# Patient Record
Sex: MALE | Race: WHITE | Employment: OTHER | URBAN - METROPOLITAN AREA
[De-identification: names, ages, dates, MRNs, and addresses within clinical notes are randomized per-mention and may not be internally consistent; named-entity substitution may affect disease eponyms.]

---

## 2017-09-18 DIAGNOSIS — Z82.49 FAMILY HISTORY OF MYOCARDIAL INFARCTION: ICD-10-CM

## 2017-09-18 DIAGNOSIS — E55.9 VITAMIN D DEFICIENCY: ICD-10-CM

## 2017-09-18 DIAGNOSIS — I10 HYPERTENSION, ESSENTIAL: ICD-10-CM

## 2017-09-18 DIAGNOSIS — Z87.898 HISTORY OF DIZZINESS: ICD-10-CM

## 2017-09-18 DIAGNOSIS — F32.A ANXIETY AND DEPRESSION: ICD-10-CM

## 2017-09-18 DIAGNOSIS — F32.A DEPRESSION, UNSPECIFIED DEPRESSION TYPE: ICD-10-CM

## 2017-09-18 DIAGNOSIS — R41.3 MEMORY LOSS: ICD-10-CM

## 2017-09-18 DIAGNOSIS — Z87.898 HISTORY OF HEADACHE: ICD-10-CM

## 2017-09-18 DIAGNOSIS — F41.9 ANXIETY AND DEPRESSION: ICD-10-CM

## 2017-09-18 DIAGNOSIS — Z72.0 TOBACCO ABUSE: ICD-10-CM

## 2017-09-19 ENCOUNTER — OFFICE VISIT (OUTPATIENT)
Dept: CARDIOLOGY | Age: 72
End: 2017-09-19

## 2017-09-19 VITALS
WEIGHT: 206.6 LBS | HEART RATE: 87 BPM | OXYGEN SATURATION: 98 % | SYSTOLIC BLOOD PRESSURE: 160 MMHG | RESPIRATION RATE: 18 BRPM | DIASTOLIC BLOOD PRESSURE: 68 MMHG

## 2017-09-19 DIAGNOSIS — Z72.0 TOBACCO ABUSE: ICD-10-CM

## 2017-09-19 DIAGNOSIS — I20.9 ANGINA PECTORIS (HCC): ICD-10-CM

## 2017-09-19 DIAGNOSIS — Z82.49 FAMILY HISTORY OF MYOCARDIAL INFARCTION: ICD-10-CM

## 2017-09-19 DIAGNOSIS — I10 HYPERTENSION, ESSENTIAL: Primary | ICD-10-CM

## 2017-09-19 PROCEDURE — 99203 OFFICE O/P NEW LOW 30 MIN: CPT | Performed by: INTERNAL MEDICINE

## 2017-09-19 PROCEDURE — 4040F PNEUMOC VAC/ADMIN/RCVD: CPT | Performed by: INTERNAL MEDICINE

## 2017-09-19 PROCEDURE — G8427 DOCREV CUR MEDS BY ELIG CLIN: HCPCS | Performed by: INTERNAL MEDICINE

## 2017-09-19 PROCEDURE — G8599 NO ASA/ANTIPLAT THER USE RNG: HCPCS | Performed by: INTERNAL MEDICINE

## 2017-09-19 PROCEDURE — 3017F COLORECTAL CA SCREEN DOC REV: CPT | Performed by: INTERNAL MEDICINE

## 2017-09-19 PROCEDURE — 4004F PT TOBACCO SCREEN RCVD TLK: CPT | Performed by: INTERNAL MEDICINE

## 2017-09-19 PROCEDURE — G8421 BMI NOT CALCULATED: HCPCS | Performed by: INTERNAL MEDICINE

## 2017-09-19 RX ORDER — METOPROLOL SUCCINATE 50 MG/1
50 TABLET, EXTENDED RELEASE ORAL NIGHTLY
Qty: 90 TABLET | Refills: 3 | Status: SHIPPED | OUTPATIENT
Start: 2017-09-19

## 2017-09-19 RX ORDER — FAMOTIDINE 40 MG/1
40 TABLET, FILM COATED ORAL NIGHTLY
Refills: 5 | COMMUNITY
Start: 2017-08-12 | End: 2017-09-20 | Stop reason: ALTCHOICE

## 2017-09-19 RX ORDER — FLUOXETINE HYDROCHLORIDE 20 MG/1
20 CAPSULE ORAL DAILY
Refills: 5 | COMMUNITY
Start: 2017-08-12 | End: 2017-09-19 | Stop reason: SDUPTHER

## 2017-09-19 RX ORDER — AMLODIPINE BESYLATE 5 MG/1
5 TABLET ORAL DAILY
Qty: 90 TABLET | Refills: 3 | Status: SHIPPED | OUTPATIENT
Start: 2017-09-19 | End: 2017-09-20 | Stop reason: ALTCHOICE

## 2017-09-19 RX ORDER — AMLODIPINE BESYLATE 5 MG/1
5 TABLET ORAL DAILY
Refills: 5 | COMMUNITY
Start: 2017-09-11 | End: 2017-09-19 | Stop reason: SDUPTHER

## 2017-09-19 RX ORDER — FLUOXETINE HYDROCHLORIDE 20 MG/1
20 CAPSULE ORAL DAILY
Qty: 90 CAPSULE | Refills: 3 | Status: SHIPPED | OUTPATIENT
Start: 2017-09-19

## 2017-09-19 ASSESSMENT — ENCOUNTER SYMPTOMS
SHORTNESS OF BREATH: 1
CHEST TIGHTNESS: 0

## 2017-09-20 ENCOUNTER — OFFICE VISIT (OUTPATIENT)
Dept: FAMILY MEDICINE CLINIC | Age: 72
End: 2017-09-20

## 2017-09-20 VITALS
WEIGHT: 205.2 LBS | BODY MASS INDEX: 31.1 KG/M2 | HEIGHT: 68 IN | DIASTOLIC BLOOD PRESSURE: 82 MMHG | SYSTOLIC BLOOD PRESSURE: 156 MMHG | RESPIRATION RATE: 14 BRPM | TEMPERATURE: 98.7 F | HEART RATE: 68 BPM

## 2017-09-20 DIAGNOSIS — G47.33 OBSTRUCTIVE SLEEP APNEA SYNDROME: ICD-10-CM

## 2017-09-20 DIAGNOSIS — I10 ESSENTIAL HYPERTENSION: Primary | ICD-10-CM

## 2017-09-20 PROCEDURE — 99213 OFFICE O/P EST LOW 20 MIN: CPT | Performed by: INTERNAL MEDICINE

## 2017-09-20 PROCEDURE — 4040F PNEUMOC VAC/ADMIN/RCVD: CPT | Performed by: INTERNAL MEDICINE

## 2017-09-20 PROCEDURE — 4004F PT TOBACCO SCREEN RCVD TLK: CPT | Performed by: INTERNAL MEDICINE

## 2017-09-20 PROCEDURE — 1123F ACP DISCUSS/DSCN MKR DOCD: CPT | Performed by: INTERNAL MEDICINE

## 2017-09-20 PROCEDURE — 3017F COLORECTAL CA SCREEN DOC REV: CPT | Performed by: INTERNAL MEDICINE

## 2017-09-20 PROCEDURE — G8427 DOCREV CUR MEDS BY ELIG CLIN: HCPCS | Performed by: INTERNAL MEDICINE

## 2017-09-20 PROCEDURE — G8417 CALC BMI ABV UP PARAM F/U: HCPCS | Performed by: INTERNAL MEDICINE

## 2017-09-20 PROCEDURE — G8599 NO ASA/ANTIPLAT THER USE RNG: HCPCS | Performed by: INTERNAL MEDICINE

## 2017-09-20 RX ORDER — AMLODIPINE BESYLATE 10 MG/1
10 TABLET ORAL DAILY
Qty: 30 TABLET | Refills: 3 | Status: SHIPPED | OUTPATIENT
Start: 2017-09-20

## 2017-09-20 ASSESSMENT — ENCOUNTER SYMPTOMS
NAUSEA: 0
TROUBLE SWALLOWING: 0
WHEEZING: 0
SORE THROAT: 0
DIARRHEA: 0
BLOOD IN STOOL: 0
EYE ITCHING: 0
EYE PAIN: 0
VOICE CHANGE: 0
EYE REDNESS: 0
ABDOMINAL PAIN: 0
COLOR CHANGE: 0
BACK PAIN: 0
SHORTNESS OF BREATH: 0
PHOTOPHOBIA: 0
EYE DISCHARGE: 0
CONSTIPATION: 0
ABDOMINAL DISTENTION: 0
COUGH: 0

## 2017-09-20 ASSESSMENT — PATIENT HEALTH QUESTIONNAIRE - PHQ9
1. LITTLE INTEREST OR PLEASURE IN DOING THINGS: 1
SUM OF ALL RESPONSES TO PHQ QUESTIONS 1-9: 2
2. FEELING DOWN, DEPRESSED OR HOPELESS: 1
SUM OF ALL RESPONSES TO PHQ9 QUESTIONS 1 & 2: 2

## 2023-11-17 ENCOUNTER — HOSPITAL ENCOUNTER (INPATIENT)
Facility: HOSPITAL | Age: 78
LOS: 3 days | Discharge: HOME | DRG: 291 | End: 2023-11-20
Attending: STUDENT IN AN ORGANIZED HEALTH CARE EDUCATION/TRAINING PROGRAM | Admitting: HOSPITALIST
Payer: MEDICARE

## 2023-11-17 ENCOUNTER — APPOINTMENT (OUTPATIENT)
Dept: RADIOLOGY | Facility: HOSPITAL | Age: 78
DRG: 291 | End: 2023-11-17
Payer: MEDICARE

## 2023-11-17 ENCOUNTER — APPOINTMENT (OUTPATIENT)
Dept: CARDIOLOGY | Facility: HOSPITAL | Age: 78
DRG: 291 | End: 2023-11-17
Payer: MEDICARE

## 2023-11-17 DIAGNOSIS — I50.9 ACUTE ON CHRONIC CONGESTIVE HEART FAILURE, UNSPECIFIED HEART FAILURE TYPE (MULTI): ICD-10-CM

## 2023-11-17 DIAGNOSIS — J96.01 ACUTE RESPIRATORY FAILURE WITH HYPOXIA (MULTI): ICD-10-CM

## 2023-11-17 DIAGNOSIS — I50.43 CHF (CONGESTIVE HEART FAILURE), NYHA CLASS I, ACUTE ON CHRONIC, COMBINED (MULTI): ICD-10-CM

## 2023-11-17 DIAGNOSIS — R79.89 ELEVATED TROPONIN LEVEL: ICD-10-CM

## 2023-11-17 LAB
ALBUMIN SERPL BCP-MCNC: 4.2 G/DL (ref 3.4–5)
ALP SERPL-CCNC: 134 U/L (ref 33–136)
ALT SERPL W P-5'-P-CCNC: 26 U/L (ref 10–52)
ANION GAP SERPL CALC-SCNC: 14 MMOL/L (ref 10–20)
AST SERPL W P-5'-P-CCNC: 28 U/L (ref 9–39)
BASOPHILS # BLD AUTO: 0.07 X10*3/UL (ref 0–0.1)
BASOPHILS NFR BLD AUTO: 1 %
BILIRUB SERPL-MCNC: 0.8 MG/DL (ref 0–1.2)
BNP SERPL-MCNC: 2647 PG/ML (ref 0–99)
BUN SERPL-MCNC: 29 MG/DL (ref 6–23)
CALCIUM SERPL-MCNC: 9.5 MG/DL (ref 8.6–10.3)
CARDIAC TROPONIN I PNL SERPL HS: 45 NG/L (ref 0–20)
CARDIAC TROPONIN I PNL SERPL HS: 47 NG/L (ref 0–20)
CHLORIDE SERPL-SCNC: 107 MMOL/L (ref 98–107)
CO2 SERPL-SCNC: 22 MMOL/L (ref 21–32)
CREAT SERPL-MCNC: 1.51 MG/DL (ref 0.5–1.3)
EOSINOPHIL # BLD AUTO: 0.19 X10*3/UL (ref 0–0.4)
EOSINOPHIL NFR BLD AUTO: 2.8 %
ERYTHROCYTE [DISTWIDTH] IN BLOOD BY AUTOMATED COUNT: 13.5 % (ref 11.5–14.5)
GFR SERPL CREATININE-BSD FRML MDRD: 47 ML/MIN/1.73M*2
GLUCOSE SERPL-MCNC: 96 MG/DL (ref 74–99)
HCT VFR BLD AUTO: 38.1 % (ref 41–52)
HGB BLD-MCNC: 12.7 G/DL (ref 13.5–17.5)
IMM GRANULOCYTES # BLD AUTO: 0.02 X10*3/UL (ref 0–0.5)
IMM GRANULOCYTES NFR BLD AUTO: 0.3 % (ref 0–0.9)
LYMPHOCYTES # BLD AUTO: 2.01 X10*3/UL (ref 0.8–3)
LYMPHOCYTES NFR BLD AUTO: 29.5 %
MAGNESIUM SERPL-MCNC: 2.2 MG/DL (ref 1.6–2.4)
MCH RBC QN AUTO: 31.9 PG (ref 26–34)
MCHC RBC AUTO-ENTMCNC: 33.3 G/DL (ref 32–36)
MCV RBC AUTO: 96 FL (ref 80–100)
MONOCYTES # BLD AUTO: 0.61 X10*3/UL (ref 0.05–0.8)
MONOCYTES NFR BLD AUTO: 8.9 %
NEUTROPHILS # BLD AUTO: 3.92 X10*3/UL (ref 1.6–5.5)
NEUTROPHILS NFR BLD AUTO: 57.5 %
NRBC BLD-RTO: 0 /100 WBCS (ref 0–0)
PLATELET # BLD AUTO: 202 X10*3/UL (ref 150–450)
POTASSIUM SERPL-SCNC: 4.4 MMOL/L (ref 3.5–5.3)
PROT SERPL-MCNC: 7.9 G/DL (ref 6.4–8.2)
RBC # BLD AUTO: 3.98 X10*6/UL (ref 4.5–5.9)
SODIUM SERPL-SCNC: 139 MMOL/L (ref 136–145)
WBC # BLD AUTO: 6.8 X10*3/UL (ref 4.4–11.3)

## 2023-11-17 PROCEDURE — 2500000004 HC RX 250 GENERAL PHARMACY W/ HCPCS (ALT 636 FOR OP/ED): Performed by: HOSPITALIST

## 2023-11-17 PROCEDURE — 93010 ELECTROCARDIOGRAM REPORT: CPT | Performed by: INTERNAL MEDICINE

## 2023-11-17 PROCEDURE — 1210000001 HC SEMI-PRIVATE ROOM DAILY

## 2023-11-17 PROCEDURE — 99223 1ST HOSP IP/OBS HIGH 75: CPT | Performed by: HOSPITALIST

## 2023-11-17 PROCEDURE — 93005 ELECTROCARDIOGRAM TRACING: CPT

## 2023-11-17 PROCEDURE — 80053 COMPREHEN METABOLIC PANEL: CPT | Performed by: STUDENT IN AN ORGANIZED HEALTH CARE EDUCATION/TRAINING PROGRAM

## 2023-11-17 PROCEDURE — 36415 COLL VENOUS BLD VENIPUNCTURE: CPT | Performed by: STUDENT IN AN ORGANIZED HEALTH CARE EDUCATION/TRAINING PROGRAM

## 2023-11-17 PROCEDURE — 2500000005 HC RX 250 GENERAL PHARMACY W/O HCPCS: Performed by: HOSPITALIST

## 2023-11-17 PROCEDURE — 84484 ASSAY OF TROPONIN QUANT: CPT | Performed by: STUDENT IN AN ORGANIZED HEALTH CARE EDUCATION/TRAINING PROGRAM

## 2023-11-17 PROCEDURE — 71046 X-RAY EXAM CHEST 2 VIEWS: CPT | Performed by: STUDENT IN AN ORGANIZED HEALTH CARE EDUCATION/TRAINING PROGRAM

## 2023-11-17 PROCEDURE — 85025 COMPLETE CBC W/AUTO DIFF WBC: CPT | Performed by: STUDENT IN AN ORGANIZED HEALTH CARE EDUCATION/TRAINING PROGRAM

## 2023-11-17 PROCEDURE — 83880 ASSAY OF NATRIURETIC PEPTIDE: CPT | Performed by: STUDENT IN AN ORGANIZED HEALTH CARE EDUCATION/TRAINING PROGRAM

## 2023-11-17 PROCEDURE — 2500000002 HC RX 250 W HCPCS SELF ADMINISTERED DRUGS (ALT 637 FOR MEDICARE OP, ALT 636 FOR OP/ED): Performed by: HOSPITALIST

## 2023-11-17 PROCEDURE — 94760 N-INVAS EAR/PLS OXIMETRY 1: CPT

## 2023-11-17 PROCEDURE — 83735 ASSAY OF MAGNESIUM: CPT | Performed by: STUDENT IN AN ORGANIZED HEALTH CARE EDUCATION/TRAINING PROGRAM

## 2023-11-17 PROCEDURE — 94640 AIRWAY INHALATION TREATMENT: CPT

## 2023-11-17 PROCEDURE — 71046 X-RAY EXAM CHEST 2 VIEWS: CPT | Mod: FY

## 2023-11-17 PROCEDURE — 99285 EMERGENCY DEPT VISIT HI MDM: CPT | Mod: 25 | Performed by: STUDENT IN AN ORGANIZED HEALTH CARE EDUCATION/TRAINING PROGRAM

## 2023-11-17 RX ORDER — FUROSEMIDE 10 MG/ML
40 INJECTION INTRAMUSCULAR; INTRAVENOUS 2 TIMES DAILY
Status: DISCONTINUED | OUTPATIENT
Start: 2023-11-17 | End: 2023-11-20

## 2023-11-17 RX ORDER — POLYETHYLENE GLYCOL 3350 17 G/17G
17 POWDER, FOR SOLUTION ORAL DAILY
Status: DISCONTINUED | OUTPATIENT
Start: 2023-11-17 | End: 2023-11-20 | Stop reason: HOSPADM

## 2023-11-17 RX ORDER — ASPIRIN 81 MG/1
81 TABLET ORAL DAILY
COMMUNITY

## 2023-11-17 RX ORDER — ACETAMINOPHEN 325 MG/1
650 TABLET ORAL EVERY 6 HOURS PRN
Status: DISCONTINUED | OUTPATIENT
Start: 2023-11-17 | End: 2023-11-20 | Stop reason: HOSPADM

## 2023-11-17 RX ORDER — ACETAMINOPHEN 160 MG/5ML
650 SOLUTION ORAL EVERY 6 HOURS PRN
Status: DISCONTINUED | OUTPATIENT
Start: 2023-11-17 | End: 2023-11-20 | Stop reason: HOSPADM

## 2023-11-17 RX ORDER — FUROSEMIDE 10 MG/ML
40 INJECTION INTRAMUSCULAR; INTRAVENOUS ONCE
Status: DISCONTINUED | OUTPATIENT
Start: 2023-11-17 | End: 2023-11-17

## 2023-11-17 RX ORDER — ENOXAPARIN SODIUM 100 MG/ML
40 INJECTION SUBCUTANEOUS EVERY 24 HOURS
Status: DISCONTINUED | OUTPATIENT
Start: 2023-11-17 | End: 2023-11-20 | Stop reason: HOSPADM

## 2023-11-17 RX ORDER — ACETAMINOPHEN 650 MG/1
650 SUPPOSITORY RECTAL EVERY 6 HOURS PRN
Status: DISCONTINUED | OUTPATIENT
Start: 2023-11-17 | End: 2023-11-20 | Stop reason: HOSPADM

## 2023-11-17 RX ORDER — ALBUTEROL SULFATE 0.83 MG/ML
2.5 SOLUTION RESPIRATORY (INHALATION)
Status: DISCONTINUED | OUTPATIENT
Start: 2023-11-17 | End: 2023-11-20

## 2023-11-17 RX ADMIN — FUROSEMIDE 40 MG: 10 INJECTION, SOLUTION INTRAMUSCULAR; INTRAVENOUS at 17:52

## 2023-11-17 RX ADMIN — ALBUTEROL SULFATE 2.5 MG: 2.5 SOLUTION RESPIRATORY (INHALATION) at 20:37

## 2023-11-17 RX ADMIN — Medication 3 L/MIN: at 20:37

## 2023-11-17 SDOH — SOCIAL STABILITY: SOCIAL INSECURITY: WERE YOU ABLE TO COMPLETE ALL THE BEHAVIORAL HEALTH SCREENINGS?: YES

## 2023-11-17 SDOH — SOCIAL STABILITY: SOCIAL INSECURITY: HAS ANYONE EVER THREATENED TO HURT YOUR FAMILY OR YOUR PETS?: NO

## 2023-11-17 SDOH — SOCIAL STABILITY: SOCIAL INSECURITY: DO YOU FEEL ANYONE HAS EXPLOITED OR TAKEN ADVANTAGE OF YOU FINANCIALLY OR OF YOUR PERSONAL PROPERTY?: NO

## 2023-11-17 SDOH — SOCIAL STABILITY: SOCIAL INSECURITY: ARE YOU OR HAVE YOU BEEN THREATENED OR ABUSED PHYSICALLY, EMOTIONALLY, OR SEXUALLY BY ANYONE?: NO

## 2023-11-17 SDOH — SOCIAL STABILITY: SOCIAL INSECURITY: HAVE YOU HAD THOUGHTS OF HARMING ANYONE ELSE?: NO

## 2023-11-17 SDOH — SOCIAL STABILITY: SOCIAL INSECURITY: DOES ANYONE TRY TO KEEP YOU FROM HAVING/CONTACTING OTHER FRIENDS OR DOING THINGS OUTSIDE YOUR HOME?: NO

## 2023-11-17 SDOH — SOCIAL STABILITY: SOCIAL INSECURITY: ARE THERE ANY APPARENT SIGNS OF INJURIES/BEHAVIORS THAT COULD BE RELATED TO ABUSE/NEGLECT?: NO

## 2023-11-17 SDOH — ECONOMIC STABILITY: HOUSING INSECURITY: DO YOU FEEL UNSAFE GOING BACK TO THE PLACE WHERE YOU LIVE?: NO

## 2023-11-17 SDOH — SOCIAL STABILITY: SOCIAL INSECURITY: ABUSE: ADULT

## 2023-11-17 SDOH — SOCIAL STABILITY: SOCIAL INSECURITY: DO YOU FEEL UNSAFE GOING BACK TO THE PLACE WHERE YOU ARE LIVING?: NO

## 2023-11-17 ASSESSMENT — ENCOUNTER SYMPTOMS
ABDOMINAL DISTENTION: 0
COUGH: 1
LIGHT-HEADEDNESS: 0
CHILLS: 0
HEADACHES: 0
FEVER: 0
VOMITING: 0
DIZZINESS: 0
WEAKNESS: 1
DIAPHORESIS: 0
NAUSEA: 0
PALPITATIONS: 0
SHORTNESS OF BREATH: 1

## 2023-11-17 ASSESSMENT — COGNITIVE AND FUNCTIONAL STATUS - GENERAL
MOBILITY SCORE: 24
PATIENT BASELINE BEDBOUND: NO
DAILY ACTIVITIY SCORE: 24

## 2023-11-17 ASSESSMENT — COLUMBIA-SUICIDE SEVERITY RATING SCALE - C-SSRS
2. HAVE YOU ACTUALLY HAD ANY THOUGHTS OF KILLING YOURSELF?: NO
6. HAVE YOU EVER DONE ANYTHING, STARTED TO DO ANYTHING, OR PREPARED TO DO ANYTHING TO END YOUR LIFE?: NO
1. IN THE PAST MONTH, HAVE YOU WISHED YOU WERE DEAD OR WISHED YOU COULD GO TO SLEEP AND NOT WAKE UP?: NO

## 2023-11-17 ASSESSMENT — ACTIVITIES OF DAILY LIVING (ADL)
ADEQUATE_TO_COMPLETE_ADL: YES
TOILETING: INDEPENDENT
GROOMING: INDEPENDENT
PATIENT'S MEMORY ADEQUATE TO SAFELY COMPLETE DAILY ACTIVITIES?: YES
JUDGMENT_ADEQUATE_SAFELY_COMPLETE_DAILY_ACTIVITIES: YES
DRESSING YOURSELF: INDEPENDENT
WALKS IN HOME: INDEPENDENT
HEARING - RIGHT EAR: FUNCTIONAL
LACK_OF_TRANSPORTATION: NO
BATHING: INDEPENDENT
HEARING - LEFT EAR: FUNCTIONAL
FEEDING YOURSELF: INDEPENDENT

## 2023-11-17 ASSESSMENT — PATIENT HEALTH QUESTIONNAIRE - PHQ9
2. FEELING DOWN, DEPRESSED OR HOPELESS: NOT AT ALL
1. LITTLE INTEREST OR PLEASURE IN DOING THINGS: NOT AT ALL
SUM OF ALL RESPONSES TO PHQ9 QUESTIONS 1 & 2: 0

## 2023-11-17 ASSESSMENT — LIFESTYLE VARIABLES
HAVE PEOPLE ANNOYED YOU BY CRITICIZING YOUR DRINKING: NO
EVER HAD A DRINK FIRST THING IN THE MORNING TO STEADY YOUR NERVES TO GET RID OF A HANGOVER: NO
HAVE YOU EVER FELT YOU SHOULD CUT DOWN ON YOUR DRINKING: NO
REASON UNABLE TO ASSESS: NO
HOW OFTEN DO YOU HAVE A DRINK CONTAINING ALCOHOL: NEVER
AUDIT-C TOTAL SCORE: 0
AUDIT-C TOTAL SCORE: 0
HOW MANY STANDARD DRINKS CONTAINING ALCOHOL DO YOU HAVE ON A TYPICAL DAY: PATIENT DOES NOT DRINK
EVER FELT BAD OR GUILTY ABOUT YOUR DRINKING: NO
HOW OFTEN DO YOU HAVE 6 OR MORE DRINKS ON ONE OCCASION: NEVER
SKIP TO QUESTIONS 9-10: 1

## 2023-11-17 ASSESSMENT — PAIN SCALES - GENERAL
PAINLEVEL_OUTOF10: 0 - NO PAIN
PAINLEVEL_OUTOF10: 0 - NO PAIN
PAINLEVEL_OUTOF10: 3
PAINLEVEL_OUTOF10: 0 - NO PAIN
PAINLEVEL_OUTOF10: 0 - NO PAIN

## 2023-11-17 ASSESSMENT — PAIN - FUNCTIONAL ASSESSMENT: PAIN_FUNCTIONAL_ASSESSMENT: 0-10

## 2023-11-17 NOTE — ED PROVIDER NOTES
HPI   Chief Complaint   Patient presents with    Shortness of Breath       This is a 78-year-old male with a history of nonischemic cardiomyopathy presenting with increasing shortness of breath on exertion.  Patient reports over the last several days he has had significant worsening shortness of breath.  Patient is unable to walk a few feet without getting short of breath.  Patient also having orthopnea and increasing lower leg swelling.  Patient is currently not on any diuretics.  Denies any chest pain.  Daughter reports that he had a cardiac cath through the [facility] earlier this year demonstrating no significant coronary artery disease.  Reports that he does not have AICD or pacemaker.      History provided by:  Patient (Patient's daughter)                      Behzad Coma Scale Score: 15                  Patient History   History reviewed. No pertinent past medical history.  History reviewed. No pertinent surgical history.  No family history on file.  Social History     Tobacco Use    Smoking status: Former     Types: Cigarettes    Smokeless tobacco: Never   Vaping Use    Vaping Use: Never used   Substance Use Topics    Alcohol use: Never    Drug use: Never       Physical Exam   ED Triage Vitals [11/17/23 1412]   Temp Heart Rate Resp BP   36.1 °C (97 °F) 84 (!) 30 162/75      SpO2 Temp Source Heart Rate Source Patient Position   96 % Tympanic Monitor Sitting      BP Location FiO2 (%)     Left arm --       Physical Exam  Vitals and nursing note reviewed.   Constitutional:       General: He is not in acute distress.  HENT:      Head: Atraumatic.      Mouth/Throat:      Mouth: Mucous membranes are moist.      Pharynx: Oropharynx is clear.   Eyes:      Extraocular Movements: Extraocular movements intact.      Conjunctiva/sclera: Conjunctivae normal.      Pupils: Pupils are equal, round, and reactive to light.   Cardiovascular:      Rate and Rhythm: Normal rate and regular rhythm.      Pulses: Normal pulses.    Pulmonary:      Effort: Pulmonary effort is normal. No respiratory distress.      Breath sounds: Normal breath sounds.   Abdominal:      General: There is no distension.      Palpations: Abdomen is soft.      Tenderness: There is no abdominal tenderness. There is no guarding or rebound.   Musculoskeletal:         General: No deformity.      Cervical back: Neck supple.      Right lower leg: Edema present.      Left lower leg: Edema present.   Skin:     General: Skin is warm and dry.   Neurological:      Mental Status: He is alert and oriented to person, place, and time. Mental status is at baseline.      Cranial Nerves: No cranial nerve deficit.      Sensory: No sensory deficit.      Motor: No weakness.   Psychiatric:         Mood and Affect: Mood normal.         Behavior: Behavior normal.         ED Course & MDM   Diagnoses as of 11/17/23 1715   Acute on chronic congestive heart failure, unspecified heart failure type (CMS/HCC)   Acute respiratory failure with hypoxia (CMS/HCC)   Elevated troponin level     Labs Reviewed   CBC WITH AUTO DIFFERENTIAL - Abnormal       Result Value    WBC 6.8      nRBC 0.0      RBC 3.98 (*)     Hemoglobin 12.7 (*)     Hematocrit 38.1 (*)     MCV 96      MCH 31.9      MCHC 33.3      RDW 13.5      Platelets 202      Neutrophils % 57.5      Immature Granulocytes %, Automated 0.3      Lymphocytes % 29.5      Monocytes % 8.9      Eosinophils % 2.8      Basophils % 1.0      Neutrophils Absolute 3.92      Immature Granulocytes Absolute, Automated 0.02      Lymphocytes Absolute 2.01      Monocytes Absolute 0.61      Eosinophils Absolute 0.19      Basophils Absolute 0.07     COMPREHENSIVE METABOLIC PANEL - Abnormal    Glucose 96      Sodium 139      Potassium 4.4      Chloride 107      Bicarbonate 22      Anion Gap 14      Urea Nitrogen 29 (*)     Creatinine 1.51 (*)     eGFR 47 (*)     Calcium 9.5      Albumin 4.2      Alkaline Phosphatase 134      Total Protein 7.9      AST 28       Bilirubin, Total 0.8      ALT 26     B-TYPE NATRIURETIC PEPTIDE - Abnormal    BNP 2,647 (*)     Narrative:        <100 pg/mL - Heart failure unlikely  100-299 pg/mL - Intermediate probability of acute heart                  failure exacerbation. Correlate with clinical                  context and patient history.    >=300 pg/mL - Heart Failure likely. Correlate with clinical                  context and patient history.    BNP testing is performed using different testing methodology at [facility] than at other [facility]. Direct result comparisons should only be made within the same method.      SERIAL TROPONIN-INITIAL - Abnormal    Troponin I, High Sensitivity 47 (*)     Narrative:     Less than 99th percentile of normal range cutoff-  Female and children under 18 years old <14 ng/L; Male <21 ng/L: Negative  Repeat testing should be performed if clinically indicated.     Female and children under 18 years old 14-50 ng/L; Male 21-50 ng/L:  Consistent with possible cardiac damage and possible increased clinical   risk. Serial measurements may help to assess extent of myocardial damage.     >50 ng/L: Consistent with cardiac damage, increased clinical risk and  myocardial infarction. Serial measurements may help assess extent of   myocardial damage.      NOTE: Children less than 1 year old may have higher baseline troponin   levels and results should be interpreted in conjunction with the overall   clinical context.     NOTE: Troponin I testing is performed using a different   testing methodology at [facility] than at other   [facility]. Direct result comparisons should only   be made within the same method.   SERIAL TROPONIN, 1 HOUR - Abnormal    Troponin I, High Sensitivity 45 (*)     Narrative:     Less than 99th percentile of normal range cutoff-  Female and children under 18 years old <14 ng/L; Male <21 ng/L: Negative  Repeat testing should be performed if clinically  indicated.     Female and children under 18 years old 14-50 ng/L; Male 21-50 ng/L:  Consistent with possible cardiac damage and possible increased clinical   risk. Serial measurements may help to assess extent of myocardial damage.     >50 ng/L: Consistent with cardiac damage, increased clinical risk and  myocardial infarction. Serial measurements may help assess extent of   myocardial damage.      NOTE: Children less than 1 year old may have higher baseline troponin   levels and results should be interpreted in conjunction with the overall   clinical context.     NOTE: Troponin I testing is performed using a different   testing methodology at [facility] than at other   [facility]. Direct result comparisons should only   be made within the same method.   MAGNESIUM - Normal    Magnesium 2.20     TROPONIN SERIES- (INITIAL, 1 HR)    Narrative:     The following orders were created for panel order Troponin I Series, High Sensitivity (0, 1 HR).  Procedure                               Abnormality         Status                     ---------                               -----------         ------                     Troponin I, High Sensiti...[[id number]]  Abnormal            Final result               Troponin, High Sensitivi...[[id number]]  Abnormal            Final result                 Please view results for these tests on the individual orders.     XR chest 2 views   Final Result   1. No evidence of acute cardiopulmonary process.   2. Mild cardiomegaly.                  MACRO:   None        Signed by: Alex Fu 11/17/2023 3:35 PM   Dictation workstation:   [id number]          Medical Decision Making  This is a 78-year-old male with a history of nonischemic cardiomyopathy with reduced EF presenting with signs consistent with exacerbation of heart failure.  Patient in no acute respiratory distress.  Diagnostic evaluation performed in the ED.  Reviewed patient's most recent echocardiogram  performed through [facility] dated 7- demonstrating EF of 30% which has improved from prior studies.    Patient was initially seen in triage.  When brought back to her room patient had desaturation to 88% on room air.  I was notified by nursing staff and patient was placed on 2 L nasal cannula with improvement.  Chest x-ray without significant pulmonary edema but demonstrates cardiomegaly and mild interstitial infiltrates by my interpretation.  Patient's lab work demonstrates significantly elevated BNP and mildly elevated troponin levels which I suspect is most likely demand related due to fluid overloaded state.  Patient will be given Lasix IV for diuresis and admitted due to acute respiratory failure with hypoxia related to CHF with acute exacerbation.    Amount and/or Complexity of Data Reviewed  ECG/medicine tests: ordered and independent interpretation performed. Decision-making details documented in ED Course.     Details: Twelve-lead ECG obtained at 1424 by my interpretation demonstrates sinus rhythm first-degree AV block, occasional PVCs, rate of 84, no STEMI.  QTc 496.  Repeat twelve-lead ECG obtained at 1708 by my interpretation demonstrates sinus rhythm with occasional PVCs, rate of 76, left axis deviation, no STEMI  Discussion of management or test interpretation with external provider(s): Case discussed with hospitalist for admission.        Procedure  Procedures     Jeferson An MD  11/17/23 0692

## 2023-11-17 NOTE — H&P
History Of Present Illness  Zechariah Martinez is a 78 y.o. male with PMH of nonischemic cardiomyopathy presenting with increasing shortness of breath for 10 days. Reports that it is difficult to breath during any activity, including holding a conversation and walking. Associated with bilateral lower extremity swelling, intermittent dry cough, orthopnea, PND, and pain in the temples and lateral neck. Denies fever, chills, chest pain, abdominal pain, nausea, vomiting, hemoptysis, palpitations, and diaphoresis. States he took a baby aspirin this morning but takes no other medications at home. Was previously on cardiac meds but he discontinued due to side effects and never followed up. Denies having an outpatient cardiologist or pulmonologist currently. Stopped smoking tobacco in March 2023 but prior to this was smoking 1 ppd. Denies alcohol and illicit drug use. Significant family history of stroke and MI. Does not have AICD or pacemaker. Denies history of HTN or DM.    In ED was placed on 2L NC due to hypoxia. CXR with significant cardiopulmonary edema but shows cardiomegaly and mild interstitial infiltrates. Significantly elevated BNP >2000, mildly elevated troponin. Was given IV lasix for diuresis. Admitted for respiratory failure with hypoxia related to CHF with acute exacerbation.     Past Medical History: Non-ischemic cardiomyopathy, HTN, CKD stage 3    Surgical History: [facility] last year      Social History  He reports that he has quit smoking. His smoking use included cigarettes. He has never used smokeless tobacco. He reports that he does not drink alcohol and does not use drugs.    Family History: +Stroke     Review of Systems   Constitutional:  Negative for chills, diaphoresis and fever.   Respiratory:  Positive for cough and shortness of breath.    Cardiovascular:  Positive for leg swelling. Negative for chest pain and palpitations.   Gastrointestinal:  Negative for abdominal distention, nausea and vomiting.    Neurological:  Positive for weakness. Negative for dizziness, light-headedness and headaches.        Physical Exam  Constitutional:       Appearance: He is not diaphoretic.      Interventions: Nasal cannula in place.   HENT:      Head: Normocephalic and atraumatic.   Eyes:      Extraocular Movements: Extraocular movements intact.      Conjunctiva/sclera: Conjunctivae normal.   Neck:      Vascular: JVD present.   Cardiovascular:      Rate and Rhythm: Normal rate and regular rhythm.      Pulses:           Radial pulses are 2+ on the right side and 2+ on the left side.        Dorsalis pedis pulses are 2+ on the right side and 2+ on the left side.      Heart sounds: Normal heart sounds.   Pulmonary:      Effort: Tachypnea and accessory muscle usage present.   Abdominal:      General: Abdomen is flat. Bowel sounds are normal. There is distension.      Palpations: Abdomen is soft.      Tenderness: There is no abdominal tenderness.   Musculoskeletal:         General: Normal range of motion.      Cervical back: Normal range of motion and neck supple.      Right lower le+ Edema present.      Left lower leg: 3+ Edema present.   Skin:     General: Skin is warm and dry.   Neurological:      Mental Status: He is alert.      Sensory: Sensation is intact.      Motor: Motor function is intact.   Psychiatric:         Mood and Affect: Mood normal.         Behavior: Behavior normal.         Thought Content: Thought content normal.         Judgment: Judgment normal.          Last Recorded Vitals  /84 (BP Location: Left arm, Patient Position: Sitting)   Pulse 77   Temp 36.1 °C (97 °F) (Temporal)   Resp 16   Wt 84 kg (185 lb 3 oz)   SpO2 98%     Relevant Results  Scheduled medications  furosemide, 40 mg, intravenous, Once      Continuous medications     PRN medications    Results for orders placed or performed during the hospital encounter of 23 (from the past 24 hour(s))   CBC and Auto Differential   Result Value  Ref Range    WBC 6.8 4.4 - 11.3 x10*3/uL    nRBC 0.0 0.0 - 0.0 /100 WBCs    RBC 3.98 (L) 4.50 - 5.90 x10*6/uL    Hemoglobin 12.7 (L) 13.5 - 17.5 g/dL    Hematocrit 38.1 (L) 41.0 - 52.0 %    MCV 96 80 - 100 fL    MCH 31.9 26.0 - 34.0 pg    MCHC 33.3 32.0 - 36.0 g/dL    RDW 13.5 11.5 - 14.5 %    Platelets 202 150 - 450 x10*3/uL    Neutrophils % 57.5 40.0 - 80.0 %    Immature Granulocytes %, Automated 0.3 0.0 - 0.9 %    Lymphocytes % 29.5 13.0 - 44.0 %    Monocytes % 8.9 2.0 - 10.0 %    Eosinophils % 2.8 0.0 - 6.0 %    Basophils % 1.0 0.0 - 2.0 %    Neutrophils Absolute 3.92 1.60 - 5.50 x10*3/uL    Immature Granulocytes Absolute, Automated 0.02 0.00 - 0.50 x10*3/uL    Lymphocytes Absolute 2.01 0.80 - 3.00 x10*3/uL    Monocytes Absolute 0.61 0.05 - 0.80 x10*3/uL    Eosinophils Absolute 0.19 0.00 - 0.40 x10*3/uL    Basophils Absolute 0.07 0.00 - 0.10 x10*3/uL   Comprehensive Metabolic Panel   Result Value Ref Range    Glucose 96 74 - 99 mg/dL    Sodium 139 136 - 145 mmol/L    Potassium 4.4 3.5 - 5.3 mmol/L    Chloride 107 98 - 107 mmol/L    Bicarbonate 22 21 - 32 mmol/L    Anion Gap 14 10 - 20 mmol/L    Urea Nitrogen 29 (H) 6 - 23 mg/dL    Creatinine 1.51 (H) 0.50 - 1.30 mg/dL    eGFR 47 (L) >60 mL/min/1.73m*2    Calcium 9.5 8.6 - 10.3 mg/dL    Albumin 4.2 3.4 - 5.0 g/dL    Alkaline Phosphatase 134 33 - 136 U/L    Total Protein 7.9 6.4 - 8.2 g/dL    AST 28 9 - 39 U/L    Bilirubin, Total 0.8 0.0 - 1.2 mg/dL    ALT 26 10 - 52 U/L   Magnesium   Result Value Ref Range    Magnesium 2.20 1.60 - 2.40 mg/dL   B-Type Natriuretic Peptide   Result Value Ref Range    BNP 2,647 (H) 0 - 99 pg/mL   Troponin I, High Sensitivity, Initial   Result Value Ref Range    Troponin I, High Sensitivity 47 (H) 0 - 20 ng/L   Troponin, High Sensitivity, 1 Hour   Result Value Ref Range    Troponin I, High Sensitivity 45 (H) 0 - 20 ng/L     XR chest 2 views    Result Date: 11/17/2023  Interpreted By:  Alex Fu, STUDY: XR CHEST 2 VIEWS;   11/17/2023 3:03 pm   INDICATION: Signs/Symptoms:Chest Pain.   COMPARISON: None.   ACCESSION NUMBER(S): [id number]   ORDERING CLINICIAN: ALEJANDRO VEGA   FINDINGS:   CARDIOMEDIASTINAL SILHOUETTE: Cardiomediastinal silhouette is mildly enlarged.   LUNGS: Lungs are clear.   ABDOMEN: No remarkable upper abdominal findings.   BONES: No acute osseous changes.       1. No evidence of acute cardiopulmonary process. 2. Mild cardiomegaly.       MACRO: None   Signed by: Alex Fu 11/17/2023 3:35 PM Dictation workstation:   [id number]      Assessment/Plan   Principal Problem:    Acute on chronic congestive heart failure, unspecified heart failure type (CMS/HCC)    Acute respiratry failure with hypoxia likely from CHF exacerbation   Acute on chronic CHF with reduced EF  Hx nonischemic cardiomyopathy   - 4/2023 CT with bilateral pleural effusions and LL atelectasis with incidental finding f pulmonary nodules measuring up to 5 mm, echo 7/2023 with EF 30%, CXR without significant pulmonary edema but shows mild interstitial infiltrates and cardiomegaly, had PVCs on monitor in room   - daughter notes he had a cath at [facility] earlier this year that showed no significant CAD  - BNP 2,647, troponin 47 & 45  - continue 40 mg IV lasix for diuresis   - continue O2 NC as needed  - echo ordered, repeat labs, daily weights    - consult cardiology for acute CHF exacerbation  - will needs medications started for CHF once stabilized     CKD stage 3: monitor cr, base line around 1.3    Hx tobacco use recently quit in March possible COPD  - very short of breath and hypoxic with SaO2 dropping into the 80s in ED, accessory muscle use  - start nebs     DVT ppx: lovenox     Sean Painting MD

## 2023-11-18 ENCOUNTER — APPOINTMENT (OUTPATIENT)
Dept: CARDIOLOGY | Facility: HOSPITAL | Age: 78
DRG: 291 | End: 2023-11-18
Payer: MEDICARE

## 2023-11-18 LAB
ANION GAP SERPL CALC-SCNC: 14 MMOL/L (ref 10–20)
ATRIAL RATE: 76 BPM
ATRIAL RATE: 84 BPM
BUN SERPL-MCNC: 29 MG/DL (ref 6–23)
CALCIUM SERPL-MCNC: 9 MG/DL (ref 8.6–10.3)
CHLORIDE SERPL-SCNC: 109 MMOL/L (ref 98–107)
CO2 SERPL-SCNC: 22 MMOL/L (ref 21–32)
CREAT SERPL-MCNC: 1.64 MG/DL (ref 0.5–1.3)
ERYTHROCYTE [DISTWIDTH] IN BLOOD BY AUTOMATED COUNT: 13.7 % (ref 11.5–14.5)
GFR SERPL CREATININE-BSD FRML MDRD: 42 ML/MIN/1.73M*2
GLUCOSE SERPL-MCNC: 89 MG/DL (ref 74–99)
HCT VFR BLD AUTO: 36 % (ref 41–52)
HGB BLD-MCNC: 11.7 G/DL (ref 13.5–17.5)
HOLD SPECIMEN: NORMAL
MAGNESIUM SERPL-MCNC: 2.03 MG/DL (ref 1.6–2.4)
MCH RBC QN AUTO: 31.6 PG (ref 26–34)
MCHC RBC AUTO-ENTMCNC: 32.5 G/DL (ref 32–36)
MCV RBC AUTO: 97 FL (ref 80–100)
NRBC BLD-RTO: 0 /100 WBCS (ref 0–0)
P AXIS: 43 DEGREES
P AXIS: 62 DEGREES
P OFFSET: 158 MS
P OFFSET: 163 MS
P ONSET: 107 MS
P ONSET: 112 MS
PLATELET # BLD AUTO: 184 X10*3/UL (ref 150–450)
POTASSIUM SERPL-SCNC: 3.9 MMOL/L (ref 3.5–5.3)
PR INTERVAL: 206 MS
PR INTERVAL: 214 MS
Q ONSET: 214 MS
Q ONSET: 215 MS
QRS COUNT: 13 BEATS
QRS COUNT: 14 BEATS
QRS DURATION: 110 MS
QRS DURATION: 112 MS
QT INTERVAL: 420 MS
QT INTERVAL: 442 MS
QTC CALCULATION(BAZETT): 496 MS
QTC CALCULATION(BAZETT): 497 MS
QTC FREDERICIA: 469 MS
QTC FREDERICIA: 478 MS
R AXIS: -3 DEGREES
R AXIS: -31 DEGREES
RBC # BLD AUTO: 3.7 X10*6/UL (ref 4.5–5.9)
SODIUM SERPL-SCNC: 141 MMOL/L (ref 136–145)
T AXIS: 58 DEGREES
T AXIS: 79 DEGREES
T OFFSET: 424 MS
T OFFSET: 436 MS
VENTRICULAR RATE: 76 BPM
VENTRICULAR RATE: 84 BPM
WBC # BLD AUTO: 7.7 X10*3/UL (ref 4.4–11.3)

## 2023-11-18 PROCEDURE — 80048 BASIC METABOLIC PNL TOTAL CA: CPT | Performed by: HOSPITALIST

## 2023-11-18 PROCEDURE — 2500000001 HC RX 250 WO HCPCS SELF ADMINISTERED DRUGS (ALT 637 FOR MEDICARE OP): Performed by: INTERNAL MEDICINE

## 2023-11-18 PROCEDURE — 2500000005 HC RX 250 GENERAL PHARMACY W/O HCPCS: Performed by: HOSPITALIST

## 2023-11-18 PROCEDURE — 93306 TTE W/DOPPLER COMPLETE: CPT | Performed by: INTERNAL MEDICINE

## 2023-11-18 PROCEDURE — 99232 SBSQ HOSP IP/OBS MODERATE 35: CPT | Performed by: HOSPITALIST

## 2023-11-18 PROCEDURE — 94640 AIRWAY INHALATION TREATMENT: CPT

## 2023-11-18 PROCEDURE — 83735 ASSAY OF MAGNESIUM: CPT | Performed by: HOSPITALIST

## 2023-11-18 PROCEDURE — 2500000004 HC RX 250 GENERAL PHARMACY W/ HCPCS (ALT 636 FOR OP/ED): Performed by: INTERNAL MEDICINE

## 2023-11-18 PROCEDURE — 2500000004 HC RX 250 GENERAL PHARMACY W/ HCPCS (ALT 636 FOR OP/ED): Performed by: HOSPITALIST

## 2023-11-18 PROCEDURE — 36415 COLL VENOUS BLD VENIPUNCTURE: CPT | Performed by: HOSPITALIST

## 2023-11-18 PROCEDURE — 99223 1ST HOSP IP/OBS HIGH 75: CPT | Performed by: INTERNAL MEDICINE

## 2023-11-18 PROCEDURE — 2500000002 HC RX 250 W HCPCS SELF ADMINISTERED DRUGS (ALT 637 FOR MEDICARE OP, ALT 636 FOR OP/ED): Performed by: HOSPITALIST

## 2023-11-18 PROCEDURE — 1210000001 HC SEMI-PRIVATE ROOM DAILY

## 2023-11-18 PROCEDURE — 85027 COMPLETE CBC AUTOMATED: CPT | Performed by: HOSPITALIST

## 2023-11-18 PROCEDURE — 93306 TTE W/DOPPLER COMPLETE: CPT

## 2023-11-18 RX ORDER — CARVEDILOL 3.12 MG/1
3.12 TABLET ORAL 2 TIMES DAILY
Status: DISCONTINUED | OUTPATIENT
Start: 2023-11-18 | End: 2023-11-20 | Stop reason: HOSPADM

## 2023-11-18 RX ORDER — VALSARTAN 80 MG/1
40 TABLET ORAL DAILY
Status: DISCONTINUED | OUTPATIENT
Start: 2023-11-18 | End: 2023-11-20 | Stop reason: HOSPADM

## 2023-11-18 RX ADMIN — FUROSEMIDE 40 MG: 10 INJECTION, SOLUTION INTRAMUSCULAR; INTRAVENOUS at 08:49

## 2023-11-18 RX ADMIN — PERFLUTREN 2 ML OF DILUTION: 6.52 INJECTION, SUSPENSION INTRAVENOUS at 10:29

## 2023-11-18 RX ADMIN — CARVEDILOL 3.12 MG: 3.12 TABLET, FILM COATED ORAL at 15:27

## 2023-11-18 RX ADMIN — ALBUTEROL SULFATE 2.5 MG: 2.5 SOLUTION RESPIRATORY (INHALATION) at 08:18

## 2023-11-18 RX ADMIN — CARVEDILOL 3.12 MG: 3.12 TABLET, FILM COATED ORAL at 21:26

## 2023-11-18 RX ADMIN — Medication 3 L/MIN: at 01:41

## 2023-11-18 RX ADMIN — ALBUTEROL SULFATE 2.5 MG: 2.5 SOLUTION RESPIRATORY (INHALATION) at 13:16

## 2023-11-18 RX ADMIN — POLYETHYLENE GLYCOL 3350 17 G: 17 POWDER, FOR SOLUTION ORAL at 08:49

## 2023-11-18 RX ADMIN — VALSARTAN 40 MG: 80 TABLET, FILM COATED ORAL at 15:27

## 2023-11-18 RX ADMIN — FUROSEMIDE 40 MG: 10 INJECTION, SOLUTION INTRAMUSCULAR; INTRAVENOUS at 16:59

## 2023-11-18 RX ADMIN — ALBUTEROL SULFATE 2.5 MG: 2.5 SOLUTION RESPIRATORY (INHALATION) at 20:15

## 2023-11-18 RX ADMIN — ALBUTEROL SULFATE 2.5 MG: 2.5 SOLUTION RESPIRATORY (INHALATION) at 01:41

## 2023-11-18 ASSESSMENT — COGNITIVE AND FUNCTIONAL STATUS - GENERAL
MOBILITY SCORE: 24
MOBILITY SCORE: 24
DAILY ACTIVITIY SCORE: 24
DAILY ACTIVITIY SCORE: 24

## 2023-11-18 ASSESSMENT — PAIN - FUNCTIONAL ASSESSMENT: PAIN_FUNCTIONAL_ASSESSMENT: 0-10

## 2023-11-18 ASSESSMENT — PAIN SCALES - GENERAL: PAINLEVEL_OUTOF10: 0 - NO PAIN

## 2023-11-18 NOTE — PROGRESS NOTES
Zechariah Martinez is a 78 y.o. male on day 1 of admission presenting with Acute on chronic congestive heart failure, unspecified heart failure type (CMS/HCC).      Subjective   Overnight, patient had difficulty sleeping due to SOB. Today patient in sitting upright in bed with NC on and appears short of breath. Endorses continued difficulty breathing, however slightly better than yesterday. Associated with improving LE swelling, nonproductive cough, PND, and exertional dyspnea. Denies chest pain, fever, chills, nausea, vomiting, dizziness, lightheadedness, and paresthesia. Reports that O2 and breathing treatments are helpful.     Objective     Last Recorded Vitals  /67   Pulse 77   Temp 36.3 °C (97.3 °F)   Resp 18   Wt 88 kg (194 lb 0.1 oz)   SpO2 99%   Intake/Output last 3 Shifts:    Intake/Output Summary (Last 24 hours) at 11/18/2023 1016  Last data filed at 11/18/2023 0457  Gross per 24 hour   Intake 150 ml   Output 2250 ml   Net -2100 ml       Admission Weight  Weight: 84 kg (185 lb 3 oz) (11/17/23 1412)    Daily Weight  11/18/23 : 88 kg (194 lb 0.1 oz)    Image Results  XR chest 2 views  Narrative: Interpreted By:  Alex Fu,   STUDY:  XR CHEST 2 VIEWS;  11/17/2023 3:03 pm      INDICATION:  Signs/Symptoms:Chest Pain.      COMPARISON:  None.      ACCESSION NUMBER(S):  [id number]      ORDERING CLINICIAN:  ALEJANDRO VEGA      FINDINGS:      CARDIOMEDIASTINAL SILHOUETTE:  Cardiomediastinal silhouette is mildly enlarged.      LUNGS:  Lungs are clear.      ABDOMEN:  No remarkable upper abdominal findings.      BONES:  No acute osseous changes.      Impression: 1. No evidence of acute cardiopulmonary process.  2. Mild cardiomegaly.              MACRO:  None      Signed by: Alex Fu 11/17/2023 3:35 PM  Dictation workstation:   [id number]      Physical Exam  Vitals reviewed.   Constitutional:       General: He is not in acute distress.     Appearance: He is not diaphoretic.      Interventions: Nasal  cannula in place.   HENT:      Mouth/Throat:      Mouth: Mucous membranes are dry.   Eyes:      Extraocular Movements: Extraocular movements intact.      Conjunctiva/sclera: Conjunctivae normal.   Cardiovascular:      Rate and Rhythm: Normal rate and regular rhythm.      Pulses:           Radial pulses are 2+ on the right side and 2+ on the left side.        Popliteal pulses are 2+ on the right side and 2+ on the left side.      Heart sounds: Normal heart sounds.   Pulmonary:      Effort: Tachypnea present. No accessory muscle usage.      Breath sounds: Examination of the right-lower field reveals rales. Examination of the left-lower field reveals rales. Rales present.   Abdominal:      General: Abdomen is flat. Bowel sounds are normal.      Palpations: Abdomen is soft. There is no mass.      Tenderness: There is no abdominal tenderness.   Musculoskeletal:      Right lower le+ Pitting Edema present.      Left lower le+ Pitting Edema present.   Skin:     General: Skin is warm and dry.   Neurological:      Mental Status: He is alert.      Motor: Motor function is intact.   Psychiatric:         Mood and Affect: Mood normal.         Behavior: Behavior normal.         Thought Content: Thought content normal.         Judgment: Judgment normal.       Relevant Results  Scheduled medications  albuterol, 2.5 mg, nebulization, q6h  enoxaparin, 40 mg, subcutaneous, q24h  furosemide, 40 mg, intravenous, BID  perflutren lipid microspheres, 0.5-10 mL of dilution, intravenous, Once in imaging  perflutren protein A microsphere, 0.5 mL, intravenous, Once in imaging  polyethylene glycol, 17 g, oral, Daily  sulfur hexafluoride microsphr, 2 mL, intravenous, Once in imaging      Continuous medications     PRN medications  PRN medications: acetaminophen **OR** acetaminophen **OR** acetaminophen, oxygen    Results for orders placed or performed during the hospital encounter of 23 (from the past 24 hour(s))   CBC and Auto  Differential   Result Value Ref Range    WBC 6.8 4.4 - 11.3 x10*3/uL    nRBC 0.0 0.0 - 0.0 /100 WBCs    RBC 3.98 (L) 4.50 - 5.90 x10*6/uL    Hemoglobin 12.7 (L) 13.5 - 17.5 g/dL    Hematocrit 38.1 (L) 41.0 - 52.0 %    MCV 96 80 - 100 fL    MCH 31.9 26.0 - 34.0 pg    MCHC 33.3 32.0 - 36.0 g/dL    RDW 13.5 11.5 - 14.5 %    Platelets 202 150 - 450 x10*3/uL    Neutrophils % 57.5 40.0 - 80.0 %    Immature Granulocytes %, Automated 0.3 0.0 - 0.9 %    Lymphocytes % 29.5 13.0 - 44.0 %    Monocytes % 8.9 2.0 - 10.0 %    Eosinophils % 2.8 0.0 - 6.0 %    Basophils % 1.0 0.0 - 2.0 %    Neutrophils Absolute 3.92 1.60 - 5.50 x10*3/uL    Immature Granulocytes Absolute, Automated 0.02 0.00 - 0.50 x10*3/uL    Lymphocytes Absolute 2.01 0.80 - 3.00 x10*3/uL    Monocytes Absolute 0.61 0.05 - 0.80 x10*3/uL    Eosinophils Absolute 0.19 0.00 - 0.40 x10*3/uL    Basophils Absolute 0.07 0.00 - 0.10 x10*3/uL   Comprehensive Metabolic Panel   Result Value Ref Range    Glucose 96 74 - 99 mg/dL    Sodium 139 136 - 145 mmol/L    Potassium 4.4 3.5 - 5.3 mmol/L    Chloride 107 98 - 107 mmol/L    Bicarbonate 22 21 - 32 mmol/L    Anion Gap 14 10 - 20 mmol/L    Urea Nitrogen 29 (H) 6 - 23 mg/dL    Creatinine 1.51 (H) 0.50 - 1.30 mg/dL    eGFR 47 (L) >60 mL/min/1.73m*2    Calcium 9.5 8.6 - 10.3 mg/dL    Albumin 4.2 3.4 - 5.0 g/dL    Alkaline Phosphatase 134 33 - 136 U/L    Total Protein 7.9 6.4 - 8.2 g/dL    AST 28 9 - 39 U/L    Bilirubin, Total 0.8 0.0 - 1.2 mg/dL    ALT 26 10 - 52 U/L   Magnesium   Result Value Ref Range    Magnesium 2.20 1.60 - 2.40 mg/dL   B-Type Natriuretic Peptide   Result Value Ref Range    BNP 2,647 (H) 0 - 99 pg/mL   Troponin I, High Sensitivity, Initial   Result Value Ref Range    Troponin I, High Sensitivity 47 (H) 0 - 20 ng/L   Troponin, High Sensitivity, 1 Hour   Result Value Ref Range    Troponin I, High Sensitivity 45 (H) 0 - 20 ng/L   Basic Metabolic Panel   Result Value Ref Range    Glucose 89 74 - 99 mg/dL    Sodium  141 136 - 145 mmol/L    Potassium 3.9 3.5 - 5.3 mmol/L    Chloride 109 (H) 98 - 107 mmol/L    Bicarbonate 22 21 - 32 mmol/L    Anion Gap 14 10 - 20 mmol/L    Urea Nitrogen 29 (H) 6 - 23 mg/dL    Creatinine 1.64 (H) 0.50 - 1.30 mg/dL    eGFR 42 (L) >60 mL/min/1.73m*2    Calcium 9.0 8.6 - 10.3 mg/dL   CBC   Result Value Ref Range    WBC 7.7 4.4 - 11.3 x10*3/uL    nRBC 0.0 0.0 - 0.0 /100 WBCs    RBC 3.70 (L) 4.50 - 5.90 x10*6/uL    Hemoglobin 11.7 (L) 13.5 - 17.5 g/dL    Hematocrit 36.0 (L) 41.0 - 52.0 %    MCV 97 80 - 100 fL    MCH 31.6 26.0 - 34.0 pg    MCHC 32.5 32.0 - 36.0 g/dL    RDW 13.7 11.5 - 14.5 %    Platelets 184 150 - 450 x10*3/uL   Magnesium   Result Value Ref Range    Magnesium 2.03 1.60 - 2.40 mg/dL   SST TOP   Result Value Ref Range    Extra Tube Hold for add-ons.      XR chest 2 views    Result Date: 11/17/2023  Interpreted By:  Alex Fu, STUDY: XR CHEST 2 VIEWS;  11/17/2023 3:03 pm   INDICATION: Signs/Symptoms:Chest Pain.   COMPARISON: None.   ACCESSION NUMBER(S): [id number]   ORDERING CLINICIAN: ALEJANDRO VEGA   FINDINGS:   CARDIOMEDIASTINAL SILHOUETTE: Cardiomediastinal silhouette is mildly enlarged.   LUNGS: Lungs are clear.   ABDOMEN: No remarkable upper abdominal findings.   BONES: No acute osseous changes.       1. No evidence of acute cardiopulmonary process. 2. Mild cardiomegaly.       MACRO: None   Signed by: Alex Fu 11/17/2023 3:35 PM Dictation workstation:   [id number]     Assessment/Plan      Principal Problem:    Acute on chronic congestive heart failure, unspecified heart failure type (CMS/HCC)    Acute respiratry failure with hypoxia likely from CHF exacerbation   Acute on chronic CHF with reduced EF  Hx nonischemic cardiomyopathy   - echo 7/2023 with EF 30%, CXR without significant pulmonary edema but shows mild interstitial infiltrates and cardiomegaly   - cardiology consulted for acute CHF exacerbation  - continue IV lasix for now, LE swelling is improving   - will  needs medications started for CHF once stabilized   - continue O2 as needed  - echo pending  - repeat labs, daily weights     CKD stage 3a baseline around 1.3  - Cr today 1.6, continue to monitor     Hx tobacco use, recently quit in March possible COPD  - continue nebs and prn O2 NC    DVT ppx: lovenox

## 2023-11-18 NOTE — CARE PLAN
Problem: Heart Failure  Goal: Improved gas exchange this shift  Outcome: Progressing  Goal: Improved urinary output this shift  Outcome: Progressing  Goal: Reduction in peripheral edema within 24 hours  Outcome: Progressing  Goal: Report improvement of dyspnea/breathlessness this shift  Outcome: Progressing  Goal: Weight from fluid excess reduced over 2-3 days, then stabilize  Outcome: Progressing  Goal: Increase self care and/or family involvement in 24 hours  Outcome: Progressing     Problem: Pain  Goal: My pain/discomfort is manageable  Outcome: Progressing   The patient's goals for the shift include  less shortness of breath     The clinical goals for the shift include  patient will maintain spo2 greater than 95%    Problem: Heart Failure  Goal: Improved gas exchange this shift  Outcome: Progressing  Goal: Improved urinary output this shift  Outcome: Progressing  Goal: Reduction in peripheral edema within 24 hours  Outcome: Progressing  Goal: Report improvement of dyspnea/breathlessness this shift  Outcome: Progressing  Goal: Weight from fluid excess reduced over 2-3 days, then stabilize  Outcome: Progressing  Goal: Increase self care and/or family involvement in 24 hours  Outcome: Progressing

## 2023-11-18 NOTE — CONSULTS
Inpatient consult to Cardiology  Consult performed by: Jose A Gallagher MD  Consult ordered by: Sean Painting MD  Reason for consult: Congestive heart failure-shortness of breath  Assessment/Recommendations: Patient was seen, chart reviewed.  Family updated about plan to follow.    Patient apparently has a history of nonischemic cardiomyopathy with a left ventricular ejection fraction of 25% initially followed by [facility] until July 2023.  Patient refused ICD and also LifeVest.    Patient states that he was having some GI symptoms and he is still completing his medications at least for the last 2 to 3 months.  He was on Coreg, lisinopril and Aldactone therapy.    Patient came with a history of shortness of breath for the last 12 days.    We will resume Coreg 3.125 mg twice a day as well as losartan 25 mg daily.    Continue with diuresis.    Long discussion with patient about possible ICD implantation.  We will rediscuss this as an outpatient basis.    Continue telemetry    Risk factor modification and lifestyle modification discussed with patient. Diet , exercise and hydration discussed with patient.    Please excuse any errors in grammar or translation related to this dictation.  Voice recognition software was utilized to prepare this document.          History Of Present Illness:    Zechariah Martinez is a 78 y.o. male presenting with with a past medical history of nonischemic cardiomyopathy with a left ventricular ejection fraction of 20 to 25% improved to 30% by echocardiogram in July 2023.  Initially followed by [facility] cardiology service.  Patient had a cardiac catheterization this year that showed mild coronary artery disease.  He refused LifeVest and also ICD.    Patient apparently was on Coreg, lisinopril and Aldactone.  He discontinued this medication for the last 3 months.    Now he is coming with symptoms of shortness of breath for the last 12 days.    Telemetry shows sinus rhythm.  EKG on  "arrival shows sinus rhythm with a ventricular hypertrophy at a rate of 84 bpm.  Duration 112 ms QT corrected 496 ms.  Creatinine 1.6.  Troponin 47, 45.    .     Last Recorded Vitals:  Vitals:    11/18/23 0141 11/18/23 0457 11/18/23 0511 11/18/23 0818   BP:   151/67    BP Location:       Patient Position:       Pulse:   77    Resp:   18    Temp:   36.3 °C (97.3 °F)    TempSrc:       SpO2: 97%  98% 99%   Weight:  88 kg (194 lb 0.1 oz)     Height:           Last Labs:  CBC - 11/18/2023:  5:46 AM  7.7 11.7 184    36.0      CMP - 11/18/2023:  5:46 AM  9.0 7.9 28 --- 0.8   _ 4.2 26 134      PTT - No results in last year.  _   _ _     Troponin I, High Sensitivity   Date/Time Value Ref Range Status   11/17/2023 03:40 PM 45 (H) 0 - 20 ng/L Final   11/17/2023 02:39 PM 47 (H) 0 - 20 ng/L Final     BNP   Date/Time Value Ref Range Status   11/17/2023 02:39 PM 2,647 (H) 0 - 99 pg/mL Final      Last I/O:  I/O last 3 completed shifts:  In: 150 (1.7 mL/kg) [P.O.:150]  Out: 2250 (25.6 mL/kg) [Urine:2250 (0.7 mL/kg/hr)]  Weight: 88 kg     Past Cardiology Tests (Last 3 Years):  EKG:  ECG 12 lead 11/18/2023      ECG 12 lead 11/18/2023    Echo: 07/2023  1. Normal LV size and moderately impaired LV systolic function, LVEF 30%.  Slightly improved compared to prior exam. No LV thrombus.  2. Normal RV size and systolic function-improved  3. Indeterminate LV diastolic function, EA fusion  4. LVH  5. Trivial MR (improved)  6. Trivial TR  7. Mild aortic sclerosis without stenosis. Mild to moderate 1-2+ AI  8. No pericardial effusion   Ejection Fractions:  No results found for: \"EF\"  Cath:  April 2023    Mild coronary artery disease    Stress Test:  No results found for this or any previous visit from the past 1095 days.    Cardiac Imaging:  No results found for this or any previous visit from the past 1095 days.      Past Medical History:  He has no past medical history on file.    Past Surgical History:  He has no past surgical history on " file.      Social History:  He reports that he has quit smoking. His smoking use included cigarettes. He has never used smokeless tobacco. He reports that he does not drink alcohol and does not use drugs.    Family History:  No family history on file.     Allergies:  Patient has no known allergies.    Inpatient Medications:  Scheduled medications   Medication Dose Route Frequency    albuterol  2.5 mg nebulization q6h    enoxaparin  40 mg subcutaneous q24h    furosemide  40 mg intravenous BID    perflutren protein A microsphere  0.5 mL intravenous Once in imaging    polyethylene glycol  17 g oral Daily    sulfur hexafluoride microsphr  2 mL intravenous Once in imaging     PRN medications   Medication    acetaminophen    Or    acetaminophen    Or    acetaminophen    oxygen     Continuous Medications   Medication Dose Last Rate     Outpatient Medications:  Current Outpatient Medications   Medication Instructions    aspirin 81 mg, oral, Daily       Physical Exam:  Physical Exam  Constitutional:       Appearance: He is not diaphoretic.      Interventions: Nasal cannula in place.   HENT:      Head: Normocephalic and atraumatic.   Eyes:      Extraocular Movements: Extraocular movements intact.      Conjunctiva/sclera: Conjunctivae normal.   Neck:      Vascular: JVD present.   Cardiovascular:      Rate and Rhythm: Normal rate and regular rhythm.      Pulses:           Radial pulses are 2+ on the right side and 2+ on the left side.        Dorsalis pedis pulses are 2+ on the right side and 2+ on the left side.      Heart sounds: Normal heart sounds.   Pulmonary:      Effort: Tachypnea and accessory muscle usage present.   Abdominal:      General: Abdomen is flat. Bowel sounds are normal. There is distension.      Palpations: Abdomen is soft.      Tenderness: There is no abdominal tenderness.   Musculoskeletal:         General: Normal range of motion.      Cervical back: Normal range of motion and neck supple.      Right lower  le+ Edema present.      Left lower leg: 3+ Edema present.   Skin:     General: Skin is warm and dry.   Neurological:      Mental Status: He is alert.      Sensory: Sensation is intact.      Motor: Motor function is intact.   Psychiatric:         Mood and Affect: Mood normal.         Behavior: Behavior normal.         Thought Content: Thought content normal.         Judgment: Judgment normal.      Assessment/Plan   Clinical impression    1.  Shortness of breath  2.  Acute on chronic systolic heart failure, decompensation  3.  Nonischemic cardiomyopathy with a left ventricular ejection fraction of 25%  4.  Mild coronary artery disease per cardiac authorization in   5.  Hypertension      Peripheral IV 23 20 G Proximal;Right;Anterior Forearm (Active)   Site Assessment Clean;Dry;Intact 23   Dressing Type Transparent 23   Line Status Flushed 23   Dressing Status Clean;Dry;Occlusive 23   Number of days: 1       Code Status:  Full Code    I spent 60 minutes in the professional and overall care of this patient.        Jose A Gallagher MD

## 2023-11-19 LAB
ANION GAP SERPL CALC-SCNC: 12 MMOL/L (ref 10–20)
AORTIC VALVE MEAN GRADIENT: 5
AORTIC VALVE PEAK VELOCITY: 1.52
AV PEAK GRADIENT: 9.2
AVA (PEAK VEL): 2.05
AVA (VTI): 1.96
BUN SERPL-MCNC: 30 MG/DL (ref 6–23)
CALCIUM SERPL-MCNC: 8.8 MG/DL (ref 8.6–10.3)
CHLORIDE SERPL-SCNC: 103 MMOL/L (ref 98–107)
CO2 SERPL-SCNC: 25 MMOL/L (ref 21–32)
CREAT SERPL-MCNC: 1.59 MG/DL (ref 0.5–1.3)
EJECTION FRACTION APICAL 4 CHAMBER: 25.4
EJECTION FRACTION: 26
ERYTHROCYTE [DISTWIDTH] IN BLOOD BY AUTOMATED COUNT: 13.7 % (ref 11.5–14.5)
GFR SERPL CREATININE-BSD FRML MDRD: 44 ML/MIN/1.73M*2
GLUCOSE SERPL-MCNC: 104 MG/DL (ref 74–99)
HCT VFR BLD AUTO: 36.9 % (ref 41–52)
HGB BLD-MCNC: 12.1 G/DL (ref 13.5–17.5)
HOLD SPECIMEN: NORMAL
HOLD SPECIMEN: NORMAL
LEFT ATRIUM VOLUME AREA LENGTH INDEX BSA: 36.9
LEFT VENTRICLE INTERNAL DIMENSION DIASTOLE: 5.36 (ref 3.5–6)
LEFT VENTRICULAR OUTFLOW TRACT DIAMETER: 1.9
MAGNESIUM SERPL-MCNC: 2.04 MG/DL (ref 1.6–2.4)
MCH RBC QN AUTO: 31.3 PG (ref 26–34)
MCHC RBC AUTO-ENTMCNC: 32.8 G/DL (ref 32–36)
MCV RBC AUTO: 96 FL (ref 80–100)
MITRAL VALVE E/E' RATIO: 11.11
NRBC BLD-RTO: 0 /100 WBCS (ref 0–0)
PLATELET # BLD AUTO: 207 X10*3/UL (ref 150–450)
POTASSIUM SERPL-SCNC: 3.9 MMOL/L (ref 3.5–5.3)
RBC # BLD AUTO: 3.86 X10*6/UL (ref 4.5–5.9)
RIGHT VENTRICLE FREE WALL PEAK S': 18.7
RIGHT VENTRICLE PEAK SYSTOLIC PRESSURE: 32.4
SODIUM SERPL-SCNC: 136 MMOL/L (ref 136–145)
TRICUSPID ANNULAR PLANE SYSTOLIC EXCURSION: 1.9
WBC # BLD AUTO: 7.6 X10*3/UL (ref 4.4–11.3)

## 2023-11-19 PROCEDURE — 80048 BASIC METABOLIC PNL TOTAL CA: CPT | Performed by: HOSPITALIST

## 2023-11-19 PROCEDURE — 2500000002 HC RX 250 W HCPCS SELF ADMINISTERED DRUGS (ALT 637 FOR MEDICARE OP, ALT 636 FOR OP/ED): Performed by: HOSPITALIST

## 2023-11-19 PROCEDURE — 83735 ASSAY OF MAGNESIUM: CPT | Performed by: HOSPITALIST

## 2023-11-19 PROCEDURE — 96372 THER/PROPH/DIAG INJ SC/IM: CPT | Performed by: HOSPITALIST

## 2023-11-19 PROCEDURE — 1200000002 HC GENERAL ROOM WITH TELEMETRY DAILY

## 2023-11-19 PROCEDURE — 2500000004 HC RX 250 GENERAL PHARMACY W/ HCPCS (ALT 636 FOR OP/ED): Performed by: INTERNAL MEDICINE

## 2023-11-19 PROCEDURE — 99233 SBSQ HOSP IP/OBS HIGH 50: CPT | Performed by: INTERNAL MEDICINE

## 2023-11-19 PROCEDURE — 99232 SBSQ HOSP IP/OBS MODERATE 35: CPT | Performed by: HOSPITALIST

## 2023-11-19 PROCEDURE — 94640 AIRWAY INHALATION TREATMENT: CPT

## 2023-11-19 PROCEDURE — 2500000004 HC RX 250 GENERAL PHARMACY W/ HCPCS (ALT 636 FOR OP/ED): Performed by: HOSPITALIST

## 2023-11-19 PROCEDURE — 85027 COMPLETE CBC AUTOMATED: CPT | Performed by: HOSPITALIST

## 2023-11-19 PROCEDURE — 2500000001 HC RX 250 WO HCPCS SELF ADMINISTERED DRUGS (ALT 637 FOR MEDICARE OP): Performed by: INTERNAL MEDICINE

## 2023-11-19 PROCEDURE — 36415 COLL VENOUS BLD VENIPUNCTURE: CPT | Performed by: HOSPITALIST

## 2023-11-19 RX ORDER — SPIRONOLACTONE 25 MG/1
12.5 TABLET ORAL DAILY
COMMUNITY
End: 2023-11-20 | Stop reason: HOSPADM

## 2023-11-19 RX ORDER — LISINOPRIL 2.5 MG/1
2.5 TABLET ORAL DAILY
COMMUNITY
End: 2023-11-20 | Stop reason: HOSPADM

## 2023-11-19 RX ORDER — FUROSEMIDE 20 MG/1
20 TABLET ORAL DAILY
COMMUNITY
End: 2023-11-20 | Stop reason: HOSPADM

## 2023-11-19 RX ORDER — OMEPRAZOLE 40 MG/1
40 CAPSULE, DELAYED RELEASE ORAL DAILY
COMMUNITY
End: 2023-11-20 | Stop reason: HOSPADM

## 2023-11-19 RX ORDER — CARVEDILOL 3.12 MG/1
3.12 TABLET ORAL
COMMUNITY
End: 2023-11-20 | Stop reason: HOSPADM

## 2023-11-19 RX ADMIN — CARVEDILOL 3.12 MG: 3.12 TABLET, FILM COATED ORAL at 21:02

## 2023-11-19 RX ADMIN — ALBUTEROL SULFATE 2.5 MG: 2.5 SOLUTION RESPIRATORY (INHALATION) at 07:03

## 2023-11-19 RX ADMIN — FUROSEMIDE 40 MG: 10 INJECTION, SOLUTION INTRAMUSCULAR; INTRAVENOUS at 08:19

## 2023-11-19 RX ADMIN — VALSARTAN 40 MG: 80 TABLET, FILM COATED ORAL at 08:41

## 2023-11-19 RX ADMIN — ENOXAPARIN SODIUM 40 MG: 40 INJECTION SUBCUTANEOUS at 16:50

## 2023-11-19 RX ADMIN — ALBUTEROL SULFATE 2.5 MG: 2.5 SOLUTION RESPIRATORY (INHALATION) at 01:36

## 2023-11-19 RX ADMIN — POLYETHYLENE GLYCOL 3350 17 G: 17 POWDER, FOR SOLUTION ORAL at 08:41

## 2023-11-19 RX ADMIN — FUROSEMIDE 40 MG: 10 INJECTION, SOLUTION INTRAMUSCULAR; INTRAVENOUS at 16:52

## 2023-11-19 RX ADMIN — CARVEDILOL 3.12 MG: 3.12 TABLET, FILM COATED ORAL at 08:43

## 2023-11-19 RX ADMIN — ALBUTEROL SULFATE 2.5 MG: 2.5 SOLUTION RESPIRATORY (INHALATION) at 13:49

## 2023-11-19 ASSESSMENT — COGNITIVE AND FUNCTIONAL STATUS - GENERAL
DAILY ACTIVITIY SCORE: 24
MOBILITY SCORE: 24

## 2023-11-19 ASSESSMENT — PAIN SCALES - GENERAL
PAINLEVEL_OUTOF10: 0 - NO PAIN
PAINLEVEL_OUTOF10: 0 - NO PAIN
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
## 2023-11-19 NOTE — CARE PLAN
The patient's goals for the shift include No SOB through end of shift    The clinical goals for the shift include Labs WNL for patient by end of shift    Over the shift, the patient did not make progress toward the following goals: none. Barriers to progression include n/a. Recommendations to address these barriers include continue current plan of care.

## 2023-11-19 NOTE — CARE PLAN
The patient's goals for the shift include Labs WNL    The clinical goals for the shift include Labs WNL      Problem: Heart Failure  Goal: Improved gas exchange this shift  Outcome: Progressing  Goal: Improved urinary output this shift  Outcome: Progressing  Goal: Reduction in peripheral edema within 24 hours  Outcome: Progressing  Goal: Report improvement of dyspnea/breathlessness this shift  Outcome: Progressing  Goal: Weight from fluid excess reduced over 2-3 days, then stabilize  Outcome: Progressing  Goal: Increase self care and/or family involvement in 24 hours  Outcome: Progressing     Problem: Pain  Goal: My pain/discomfort is manageable  Outcome: Progressing     Problem: Safety  Goal: Patient will be injury free during hospitalization  Outcome: Progressing  Goal: I will remain free of falls  Outcome: Progressing     Problem: Daily Care  Goal: Daily care needs are met  Outcome: Progressing     Problem: Psychosocial Needs  Goal: Demonstrates ability to cope with hospitalization/illness  Outcome: Progressing  Goal: Collaborate with me, my family, and caregiver to identify my specific goals  Outcome: Progressing  Flowsheets (Taken 11/18/2023 2000)  Cultural Requests During Hospitalization: denies  Spiritual Requests During Hospitalization: denies     Problem: Discharge Barriers  Goal: My discharge needs are met  Outcome: Progressing

## 2023-11-19 NOTE — PROGRESS NOTES
"Zechariah Magana is a 78 y.o. male on day 2 of admission presenting with Acute on chronic congestive heart failure, unspecified heart failure type (CMS/HCC).      Subjective   Overnight, patient had issues with his RN turning down his O2. States he felt like he couldn't breathe and was in a panic. Reassured patient that RN did not turn off his O2 but lowered it to the amount he was on originally. Denied lab draws this morning.    Patient sitting up in bed in NAD. On 3L NC. States that his breathing is \"okay\" today but notes that he feels like he is improving. Endorses weakness, exertional dyspnea, and PND. Denies fever, chills, chest pain, cough, headache, neck pain, nausea, vomiting, and paresthesia.     Objective     Last Recorded Vitals  /65 (BP Location: Left arm, Patient Position: Lying)   Pulse 72   Temp 36 °C (96.8 °F) (Temporal)   Resp 16   Wt 88 kg (194 lb 0.1 oz)   SpO2 98%   Intake/Output last 3 Shifts:    Intake/Output Summary (Last 24 hours) at 11/19/2023 1003  Last data filed at 11/19/2023 0411  Gross per 24 hour   Intake 840 ml   Output 2850 ml   Net -2010 ml       Admission Weight  Weight: 84 kg (185 lb 3 oz) (11/17/23 1412)    Daily Weight  11/18/23 : 88 kg (194 lb 0.1 oz)    Image Results  Transthoracic Echo (TTE) [place] [address]   Tel [phone] Fax [phone]    TRANSTHORACIC ECHOCARDIOGRAM REPORT       Patient Name:      ZECHARIAH MAGANA        Nina Physician:    [id number] Price DO  Study Date:        11/18/2023           Ordering Provider:    [id number] SRIRAM SHRESTHA  MRN/PID:           [id number]             Fellow:  Accession#:        [id number]         Nurse:  Date of Birth/Age: 1945 / 78 years Sonographer:          Evelyn Mitchell RDCS  Gender:            M                    Additional " ----- Message from Anders Asif DO sent at 10/2/2023  1:00 PM CDT -----   Normal labs except arthritis of kneeds noted  Can take otc creams  Or ortho eval     Staff:  Height:            175.26 cm            Admit Date:           11/17/2023  Weight:            88.00 kg             Admission Status:     Inpatient -                                                                Routine  BSA:               2.04 m2              Department Location:  [address] [place] [address]  Blood Pressure: 151 /67 mmHg    Study Type:    TRANSTHORACIC ECHO (TTE) COMPLETE  Diagnosis/ICD: Acute on chronic combined systolic (congestive) and diastolic                 (congestive) heart failure (CHF)-I50.43  Indication:    Congestive Heart Failure  CPT Codes:     Echo Complete w Full Doppler-[id number]    Patient History:  Smoker:            Former.  Pertinent History: Dyspnea, Cardiomyopathy and LE Edema.    Study Detail: The following Echo studies were performed: 2D, M-Mode, Doppler and                color flow. Definity used as a contrast agent for endocardial                border definition. Total contrast used for this procedure was 2 mL                via IV push. The patient was awake.       PHYSICIAN INTERPRETATION:  Left Ventricle: Left ventricular systolic function is normal, with an estimated ejection fraction of 25-30%. There are no regional wall motion abnormalities. The left ventricular cavity size is normal. Left ventricular diastolic filling was indeterminate.  Left Atrium: The left atrium is mildly dilated.  Right Ventricle: The right ventricle is normal in size. There is normal right ventricular global systolic function.  Right Atrium: The right atrium is normal in size.  Aortic Valve: The aortic valve appears structurally normal. The aortic valve appears tricuspid. There is mild aortic valve cusp calcification. There is no evidence of aortic valve stenosis.  There is moderate aortic valve regurgitation. The peak instantaneous gradient of the aortic valve is 9.2 mmHg. The mean gradient of the aortic valve is 5.0 mmHg.  Mitral Valve: The mitral valve is normal in structure. There is no evidence of  mitral valve stenosis. There is normal mitral valve leaflet mobility. There is mild mitral annular calcification. There is mild mitral valve regurgitation.  Tricuspid Valve: The tricuspid valve is structurally normal. There is normal tricuspid valve leaflet mobility. There is mild tricuspid regurgitation.  Pulmonic Valve: The pulmonic valve is structurally normal. There is trace to mild pulmonic valve regurgitation.  Pericardium: There is no pericardial effusion noted.  Aorta: The aortic root is normal.  Pulmonary Artery: The main pulmonary artery is normal in size, and position, with normal bifurcation into the left and right pulmonary arteries. The tricuspid regurgitant velocity is 2.71 m/s, and with an estimated right atrial pressure of 3 mmHg, the estimated pulmonary artery pressure is mildly elevated with the RVSP at 32.4 mmHg.  Systemic Veins: The inferior vena cava appears mildly dilated.  In comparison to the previous echocardiogram(s): There are no prior studies on this patient for comparison purposes.       CONCLUSIONS:   1. Left ventricular systolic function is normal with a 25-30% estimated ejection fraction.   2. There is no evidence of mitral valve stenosis.   3. Mild mitral valve regurgitation.   4. Mild tricuspid regurgitation is visualized.   5. Aortic valve stenosis is not present.   6. Moderate aortic valve regurgitation.   7. The main pulmonary artery is normal in size, and position, with normal bifurcation into the left and right pulmonary arteries.    QUANTITATIVE DATA SUMMARY:  2D MEASUREMENTS:                            Normal Ranges:  Ao Root d:     3.10 cm    (2.0-3.7cm)  LAs:           4.50 cm    (2.7-4.0cm)  IVSd:          1.13 cm    (0.6-1.1cm)  LVPWd:         1.05 cm    (0.6-1.1cm)  LVIDd:         5.36 cm    (3.9-5.9cm)  LVIDs:         4.73 cm  LV Mass Index: 112.4 g/m2  LV % FS        11.8 %    LA VOLUME:                                Normal Ranges:  LA Vol A4C:        68.6 ml     (22+/-6mL/m2)  LA Vol A2C:        69.7 ml  LA Vol BP:         75.3 ml  LA Vol Index A4C:  33.6ml/m2  LA Vol Index A2C:  34.2 ml/m2  LA Vol Index BP:   36.9 ml/m2  LA Area A4C:       24.3 cm2  LA Area A2C:       22.5 cm2  LA Major Axis A4C: 7.3 cm  LA Major Axis A2C: 6.2 cm  LA Volume Index:   35.3 ml/m2    RA VOLUME BY A/L METHOD:                                Normal Ranges:  RA Vol A4C:        65.0 ml    (8.3-19.5ml)  RA Vol Index A4C:  31.9 ml/m2  RA Area A4C:       21.3 cm2  RA Major Axis A4C: 5.9 cm    AORTA MEASUREMENTS:                     Normal Ranges:  Asc Ao, d: 3.00 cm (2.1-3.4cm)    LV SYSTOLIC FUNCTION BY 2D PLANIMETRY (MOD):                      Normal Ranges:  EF-A4C View: 25.4 % (>=55%)  EF-A2C View: 24.1 %  EF-Biplane:  26.4 %    LV DIASTOLIC FUNCTION:                         Normal Ranges:  MV Peak E:    0.99 m/s (0.7-1.2 m/s)  MV e'         0.09 m/s (>8.0)  MV lateral e' 0.09 m/s  E/e' Ratio:   11.11    (<8.0)    MITRAL VALVE:                  Normal Ranges:  MV DT: 212 msec (150-240msec)    AORTIC VALVE:                                    Normal Ranges:  AoV Vmax:                1.52 m/s (<=1.7m/s)  AoV Peak P.2 mmHg (<20mmHg)  AoV Mean P.0 mmHg (1.7-11.5mmHg)  LVOT Max Holden:            1.10 m/s (<=1.1m/s)  AoV VTI:                 32.50 cm (18-25cm)  LVOT VTI:                22.50 cm  LVOT Diameter:           1.90 cm  (1.8-2.4cm)  AoV Area, VTI:           1.96 cm2 (2.5-5.5cm2)  AoV Area,Vmax:           2.05 cm2 (2.5-4.5cm2)  AoV Dimensionless Index: 0.69    AORTIC INSUFFICIENCY:  AI Vmax:       3.32 m/s  AI Half-time:  356 msec  AI Decel Rate: 317.67 cm/s2       RIGHT VENTRICLE:  RV Basal 4.70 cm  RV Mid   3.49 cm  RV Major 8.5 cm  TAPSE:   19.1 mm  RV s'    0.19 m/s    TRICUSPID VALVE/RVSP:                              Normal Ranges:  Peak TR Velocity: 2.71 m/s  RV Syst Pressure: 32.4 mmHg (< 30mmHg)  IVC Diam:         2.10 cm    PULMONIC VALVE:                           Normal Ranges:  PV Accel Time: 90 msec  (>120ms)  PV Max Holden:    1.2 m/s  (0.6-0.9m/s)  PV Max P.4 mmHg       [id number] Jeferson Price DO  Electronically signed on 2023 at 8:34:28 AM       ** Final **      Physical Exam  Constitutional:       General: He is not in acute distress.     Appearance: He is not toxic-appearing or diaphoretic.      Interventions: Nasal cannula in place.   HENT:      Head: Normocephalic and atraumatic.   Eyes:      Extraocular Movements: Extraocular movements intact.      Conjunctiva/sclera: Conjunctivae normal.      Pupils: Pupils are equal, round, and reactive to light.   Cardiovascular:      Rate and Rhythm: Normal rate and regular rhythm.      Pulses:           Radial pulses are 2+ on the right side and 2+ on the left side.      Heart sounds: Normal heart sounds.   Pulmonary:      Effort: Pulmonary effort is normal. No accessory muscle usage or respiratory distress.      Breath sounds: Normal breath sounds.   Chest:      Chest wall: No tenderness.   Abdominal:      General: Abdomen is flat. Bowel sounds are normal.      Palpations: Abdomen is soft. There is no mass.      Tenderness: There is no abdominal tenderness.   Musculoskeletal:      Cervical back: Normal range of motion and neck supple.      Right lower le+ Pitting Edema present.      Left lower le+ Pitting Edema present.   Neurological:      Mental Status: He is alert.      Motor: Motor function is intact.   Psychiatric:         Mood and Affect: Mood normal.         Behavior: Behavior normal.         Thought Content: Thought content normal.         Judgment: Judgment normal.       Relevant Results  Scheduled medications  albuterol, 2.5 mg, nebulization, q6h  carvedilol, 3.125 mg, oral, BID  enoxaparin, 40 mg, subcutaneous, q24h  furosemide, 40 mg, intravenous, BID  perflutren protein A microsphere, 0.5 mL, intravenous, Once in imaging  polyethylene glycol, 17 g, oral, Daily  sulfur hexafluoride  microsphr, 2 mL, intravenous, Once in imaging  valsartan, 40 mg, oral, Daily      Continuous medications     PRN medications  PRN medications: acetaminophen **OR** acetaminophen **OR** acetaminophen, oxygen    No results found for this or any previous visit (from the past 24 hour(s)).    Transthoracic Echo (TTE) Complete    Result Date: 11/19/2023          [place] [id number]  Tel [phone] Fax [phone] TRANSTHORACIC ECHOCARDIOGRAM REPORT  Patient Name:      YULY Wood Physician:    [id number] Jeferson Price DO Study Date:        11/18/2023           Ordering Provider:    [id number] SRIRAM SHRESTHA MRN/PID:           [id number]             Fellow: Accession#:        [id number]         Nurse: Date of Birth/Age: 1945 / 78 years Sonographer:          Evelyn Mitchell RDCS Gender:            M                    Additional Staff: Height:            175.26 cm            Admit Date:           11/17/2023 Weight:            88.00 kg             Admission Status:     Inpatient -                                                               Routine BSA:               2.04 m2              Department Location:  [address] [place] [address] Blood Pressure: 151 /67 mmHg Study Type:    TRANSTHORACIC ECHO (TTE) COMPLETE Diagnosis/ICD: 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 Pertinent History: Dyspnea, Cardiomyopathy and LE Edema. Study Detail: The following Echo studies were performed: 2D, M-Mode, Doppler and               color flow. Definity used as a contrast agent for endocardial               border definition. Total contrast used for this procedure was 2 mL                via IV push. The patient was awake.  PHYSICIAN INTERPRETATION: Left Ventricle: Left ventricular systolic function is normal, with an estimated ejection fraction of 25-30%. There are no regional wall motion abnormalities. The left ventricular cavity size is normal. Left ventricular diastolic filling was indeterminate. Left Atrium: The left atrium is mildly dilated. Right Ventricle: The right ventricle is normal in size. There is normal right ventricular global systolic function. Right Atrium: The right atrium is normal in size. Aortic Valve: The aortic valve appears structurally normal. The aortic valve appears tricuspid. There is mild aortic valve cusp calcification. There is no evidence of aortic valve stenosis. There is moderate aortic valve regurgitation. The peak instantaneous gradient of the aortic valve is 9.2 mmHg. The mean gradient of the aortic valve is 5.0 mmHg. Mitral Valve: The mitral valve is normal in structure. There is no evidence of mitral valve stenosis. There is normal mitral valve leaflet mobility. There is mild mitral annular calcification. There is mild mitral valve regurgitation. Tricuspid Valve: The tricuspid valve is structurally normal. There is normal tricuspid valve leaflet mobility. There is mild tricuspid regurgitation. Pulmonic Valve: The pulmonic valve is structurally normal. There is trace to mild pulmonic valve regurgitation. Pericardium: There is no pericardial effusion noted. Aorta: The aortic root is normal. Pulmonary Artery: The main pulmonary artery is normal in size, and position, with normal bifurcation into the left and right pulmonary arteries. The tricuspid regurgitant velocity is 2.71 m/s, and with an estimated right atrial pressure of 3 mmHg, the estimated pulmonary artery pressure is mildly elevated with the RVSP at 32.4 mmHg. Systemic Veins: The inferior vena cava appears mildly dilated. In comparison to the previous echocardiogram(s): There are no prior  studies on this patient for comparison purposes.  CONCLUSIONS:  1. Left ventricular systolic function is normal with a 25-30% estimated ejection fraction.  2. There is no evidence of mitral valve stenosis.  3. Mild mitral valve regurgitation.  4. Mild tricuspid regurgitation is visualized.  5. Aortic valve stenosis is not present.  6. Moderate aortic valve regurgitation.  7. The main pulmonary artery is normal in size, and position, with normal bifurcation into the left and right pulmonary arteries. QUANTITATIVE DATA SUMMARY: 2D MEASUREMENTS:                           Normal Ranges: Ao Root d:     3.10 cm    (2.0-3.7cm) LAs:           4.50 cm    (2.7-4.0cm) IVSd:          1.13 cm    (0.6-1.1cm) LVPWd:         1.05 cm    (0.6-1.1cm) LVIDd:         5.36 cm    (3.9-5.9cm) LVIDs:         4.73 cm LV Mass Index: 112.4 g/m2 LV % FS        11.8 % LA VOLUME:                               Normal Ranges: LA Vol A4C:        68.6 ml    (22+/-6mL/m2) LA Vol A2C:        69.7 ml LA Vol BP:         75.3 ml LA Vol Index A4C:  33.6ml/m2 LA Vol Index A2C:  34.2 ml/m2 LA Vol Index BP:   36.9 ml/m2 LA Area A4C:       24.3 cm2 LA Area A2C:       22.5 cm2 LA Major Axis A4C: 7.3 cm LA Major Axis A2C: 6.2 cm LA Volume Index:   35.3 ml/m2 RA VOLUME BY A/L METHOD:                               Normal Ranges: RA Vol A4C:        65.0 ml    (8.3-19.5ml) RA Vol Index A4C:  31.9 ml/m2 RA Area A4C:       21.3 cm2 RA Major Axis A4C: 5.9 cm AORTA MEASUREMENTS:                    Normal Ranges: Asc Ao, d: 3.00 cm (2.1-3.4cm) LV SYSTOLIC FUNCTION BY 2D PLANIMETRY (MOD):                     Normal Ranges: EF-A4C View: 25.4 % (>=55%) EF-A2C View: 24.1 % EF-Biplane:  26.4 % LV DIASTOLIC FUNCTION:                        Normal Ranges: MV Peak E:    0.99 m/s (0.7-1.2 m/s) MV e'         0.09 m/s (>8.0) MV lateral e' 0.09 m/s E/e' Ratio:   11.11    (<8.0) MITRAL VALVE:                 Normal Ranges: MV DT: 212 msec (150-240msec) AORTIC VALVE:                                    Normal Ranges: AoV Vmax:                1.52 m/s (<=1.7m/s) AoV Peak P.2 mmHg (<20mmHg) AoV Mean P.0 mmHg (1.7-11.5mmHg) LVOT Max Holden:            1.10 m/s (<=1.1m/s) AoV VTI:                 32.50 cm (18-25cm) LVOT VTI:                22.50 cm LVOT Diameter:           1.90 cm  (1.8-2.4cm) AoV Area, VTI:           1.96 cm2 (2.5-5.5cm2) AoV Area,Vmax:           2.05 cm2 (2.5-4.5cm2) AoV Dimensionless Index: 0.69 AORTIC INSUFFICIENCY: AI Vmax:       3.32 m/s AI Half-time:  356 msec AI Decel Rate: 317.67 cm/s2  RIGHT VENTRICLE: RV Basal 4.70 cm RV Mid   3.49 cm RV Major 8.5 cm TAPSE:   19.1 mm RV s'    0.19 m/s TRICUSPID VALVE/RVSP:                             Normal Ranges: Peak TR Velocity: 2.71 m/s RV Syst Pressure: 32.4 mmHg (< 30mmHg) IVC Diam:         2.10 cm PULMONIC VALVE:                         Normal Ranges: PV Accel Time: 90 msec  (>120ms) PV Max Holden:    1.2 m/s  (0.6-0.9m/s) PV Max P.4 mmHg  [id number] Price DO Electronically signed on 2023 at 8:34:28 AM  ** Final **     ECG 12 lead    Result Date: 2023  Sinus rhythm with occasional Premature ventricular complexes Left axis deviation Moderate voltage criteria for LVH, may be normal variant Nonspecific ST and T wave abnormality Prolonged QT Abnormal ECG When compared with ECG of 2023 14:24, No significant change was found See ED provider note for full interpretation and clinical correlation Confirmed by Jose A Gallagher ([id number]) on 2023 12:42:53 PM    ECG 12 lead    Result Date: 2023  Sinus rhythm with 1st degree AV block with occasional Premature ventricular complexes Minimal voltage criteria for LVH, may be normal variant Nonspecific ST and T wave abnormality Prolonged QT Abnormal ECG When compared with ECG of 06-MAY-2016 23:25, Premature ventricular complexes are now Present QRS duration has increased Nonspecific T wave abnormality, worse in Lateral leads QT has  lengthened See ED provider note for full interpretation and clinical correlation Confirmed by oJse A Gallagher ([id number]) on 11/18/2023 12:42:47 PM    XR chest 2 views    Result Date: 11/17/2023  Interpreted By:  Alex Fu, STUDY: XR CHEST 2 VIEWS;  11/17/2023 3:03 pm   INDICATION: Signs/Symptoms:Chest Pain.   COMPARISON: None.   ACCESSION NUMBER(S): [id number]   ORDERING CLINICIAN: ALEJANDRO VEGA   FINDINGS:   CARDIOMEDIASTINAL SILHOUETTE: Cardiomediastinal silhouette is mildly enlarged.   LUNGS: Lungs are clear.   ABDOMEN: No remarkable upper abdominal findings.   BONES: No acute osseous changes.       1. No evidence of acute cardiopulmonary process. 2. Mild cardiomegaly.       MACRO: None   Signed by: Alex Fu 11/17/2023 3:35 PM Dictation workstation:   [id number]     Assessment/Plan      Principal Problem:    Acute on chronic congestive heart failure, unspecified heart failure type (CMS/HCC)    Acute respiratry failure with hypoxia likely from CHF exacerbation   Acute on chronic CHF with reduced EF  Hx nonischemic cardiomyopathy   - echo 11/18/23: LVEF 25-30%, mild mitral and tricuspid regurgitation, moderate aortic regurgitation  - echo 7/2023 with EF 30%   - continue IV lasix for now, LE swelling improving  - start coreg and valsartan  - continue O2 as needed  - repeat labs, daily weights,   - cardiology following   - not been taking his meds. Non-compliant, daughter interested in discussion with palliative care.      CKD stage 3a baseline around 1.3  - continue to monitor      Hx tobacco use, recently quit in March possible COPD  - continue nebs and prn O2 NC     DVT ppx: lovenox

## 2023-11-19 NOTE — PROGRESS NOTES
[facility] Progress Note               Rounding Cardiologist:  Jose A Gallagher MD, MD   Primary Cardiologist: Dr. Jose A Gallagher    Date:  11/19/2023  Patient:  Zechariah Martinez  YOB: 1945  MRN:  [id number]   Admit Date:  11/17/2023      SUBJECTIVE    Zechariah Martinez was seen and examined today at bedside. He denies any chest pain or shortness of breath.   Feeling better  Less shortness of breath  Overnight apparently patient had an episode of dyspnea.  Telemetry still not connected  On beta-blocker and losartan therapy  Creatinine 1.59    VITALS     Vitals:    11/19/23 0546 11/19/23 0703 11/19/23 0900 11/19/23 1349   BP: 113/61  148/65    BP Location: Left arm  Left arm    Patient Position: Lying  Lying    Pulse: 67  72    Resp: 15  16    Temp: 35.9 °C (96.6 °F)  36 °C (96.8 °F)    TempSrc: Temporal  Temporal    SpO2: 97% 99% 98% 97%   Weight:       Height:           Intake/Output Summary (Last 24 hours) at 11/19/2023 1402  Last data filed at 11/19/2023 1039  Gross per 24 hour   Intake 840 ml   Output 2750 ml   Net -1910 ml       [unfilled]    PHYSICAL EXAM   PHYSICAL EXAMINATION:  GENERAL:  Well developed, well nourished, in no acute distress.  CHEST:  Symmetric and nontender.  NEURO/PSYCH:  Alert and oriented times three with approppriate behavior and responses.  NECK:  Supple, no JVD, no bruit.  LUNGS:  Clear to auscultation bilaterally, normal respiratory effort.  HEART:  Rate and rhythm regular with no evident murmur, no gallop appreciated.        There are no rubs, clicks or heaves.  EXTREMITIES: Edema in the lower extremities present PERIPHERAL VASCULAR:  Pulses present and equally palpable; 2+ throughout.      DIAGNOSTIC RESULTS   EKG:     Telemetry: Not connected with telemetry.    Echocardiogram November 17, 2023    CONCLUSIONS:   1. Left ventricular systolic function is normal with a 25-30% estimated ejection fraction.   2. There is no evidence of mitral valve stenosis.   3. Mild mitral valve  "regurgitation.   4. Mild tricuspid regurgitation is visualized.   5. Aortic valve stenosis is not present.   6. Moderate aortic valve regurgitation.   7. The main pulmonary artery is normal in size, and position, with normal bifurcation into the left and right pulmonary arteries.  LAB DATA   BMP:  @LABRCNT(Na:3,K:3,CL:3,CO2:3,Bun:3,Creatinine:3,Glu:3, CA:3,LABGLOM)@    Cardiac Enzymes:  @LABRCNT(CKTOTAL:3,CKMB:3,CKMBINDEX:3,TROPONINI:3)@    CBC:   Lab Results   Component Value Date    WBC 7.7 11/18/2023    RBC 3.70 (L) 11/18/2023    HGB 11.7 (L) 11/18/2023    HCT 36.0 (L) 11/18/2023     11/18/2023       CMP:    Lab Results   Component Value Date     11/18/2023    K 3.9 11/18/2023     (H) 11/18/2023    CO2 22 11/18/2023    BUN 29 (H) 11/18/2023    CREATININE 1.64 (H) 11/18/2023    GLUCOSE 89 11/18/2023    CALCIUM 9.0 11/18/2023       Hepatic Function Panel:    Lab Results   Component Value Date    ALKPHOS 134 11/17/2023    ALT 26 11/17/2023    AST 28 11/17/2023    PROT 7.9 11/17/2023    BILITOT 0.8 11/17/2023       Magnesium:    Lab Results   Component Value Date    MG 2.03 11/18/2023       PT/INR:  No results found for: \"PROTIME\", \"INR\"  @LABRCNT(inr:3)@     HgBA1c:  No components found for: \"LABA1C\"    Lipid Profile:  No results found for: \"CHLPL\", \"TRIG\", \"HDL\", \"LDLCALC\", \"LDLDIRECT\"    TSH:  No results found for: \"TSH\"    ABG:  No results found for: \"PH\"    PRO-BNP: No results found for: \"PROBNP\"       RADIOLOGY     Transthoracic Echo (TTE) Complete   Final Result      XR chest 2 views   Final Result   1. No evidence of acute cardiopulmonary process.   2. Mild cardiomegaly.                  MACRO:   None        Signed by: Alex Fu 11/17/2023 3:35 PM   Dictation workstation:   [id number]          @[facility]@      CURRENT MEDICATIONS    albuterol, 2.5 mg, nebulization, q6h  carvedilol, 3.125 mg, oral, BID  enoxaparin, 40 mg, subcutaneous, q24h  furosemide, 40 mg, intravenous, BID  perflutren " protein A microsphere, 0.5 mL, intravenous, Once in imaging  polyethylene glycol, 17 g, oral, Daily  sulfur hexafluoride microsphr, 2 mL, intravenous, Once in imaging  valsartan, 40 mg, oral, Daily             ASSESSMENT   Principal Problem:    Acute on chronic congestive heart failure, unspecified heart failure type (CMS/HCC)        Patient Active Problem List   Diagnosis    Acute on chronic congestive heart failure, unspecified heart failure type (CMS/HCC)     Clinical impression     1.  Shortness of breath  2.  Acute on chronic systolic heart failure, decompensation  3.  Nonischemic cardiomyopathy with a left ventricular ejection fraction of 25%  4.  Mild coronary artery disease per cardiac authorization in 2023  5.  Hypertension    PLAN     We will continue with current medical therapy.  Creatinine increased a little bit.  We may have to hold Lasix once a day instead of twice a day  If renal function is stable, tomorrow we can start Entresto  Long conversation with patient and family member regarding LifeVest and ICD implantation in the near future.  Patient will think about this.  Patient is to be on telemetry    Risk factor modification and lifestyle modification discussed with patient. Diet , exercise and hydration discussed with patient.    Please excuse any errors in grammar or translation related to this dictation.  Voice recognition software was utilized to prepare this document.  '    Please do not hesitate to call with questions.  Electronically signed by Jose A Gallagher MD, [facility] on 11/19/2023 at 2:02 PM

## 2023-11-19 NOTE — NURSING NOTE
"0550: Jeremy PCT went in patient room to take vitals. O2 at 8LNC on patient. PCT came and informed this RN. This RN noted O2 at 8L and turned O2 down to 3LNC.     0611: Patient refused Lab draw.  informed me. This RN spoke with Patient. Per Patient: \"No blood to be drawn, I refuse. I almost  last night because someone turned my O2 off\".  I asked Patient who turned O2 off and patient stated \"I don't know, I didn't see them\". This RN asked Patient when this event happened why didn't you let me know or use the call light to let us know and patient stated \"No blood draws, I've lost trust in this [facility]\" and waved me out of room.   Notified Dr. Sirena Hernández patient refused lab draw and  no new orders.     0615: Spoke to RRT Cecil: Cecil advised that he had given patient a breathing treatment.     0630: This RN rounding on patient. Patient stated to this RN \"You tried to kill me last night, you confessed to me that you turned my O2 off\". I advised patient that this RN did not turn O2 off.  Patient asked me to leave room. This RN left patient room.    0640: Spoke with and notified Siri RN supervisor   "

## 2023-11-20 VITALS
OXYGEN SATURATION: 99 % | HEIGHT: 69 IN | SYSTOLIC BLOOD PRESSURE: 131 MMHG | DIASTOLIC BLOOD PRESSURE: 65 MMHG | RESPIRATION RATE: 16 BRPM | HEART RATE: 74 BPM | TEMPERATURE: 97.7 F | WEIGHT: 194 LBS | BODY MASS INDEX: 28.73 KG/M2

## 2023-11-20 PROCEDURE — 2500000004 HC RX 250 GENERAL PHARMACY W/ HCPCS (ALT 636 FOR OP/ED): Performed by: HOSPITALIST

## 2023-11-20 PROCEDURE — 2500000001 HC RX 250 WO HCPCS SELF ADMINISTERED DRUGS (ALT 637 FOR MEDICARE OP): Performed by: INTERNAL MEDICINE

## 2023-11-20 PROCEDURE — 2500000004 HC RX 250 GENERAL PHARMACY W/ HCPCS (ALT 636 FOR OP/ED): Performed by: INTERNAL MEDICINE

## 2023-11-20 PROCEDURE — 99239 HOSP IP/OBS DSCHRG MGMT >30: CPT | Performed by: HOSPITALIST

## 2023-11-20 PROCEDURE — 2500000002 HC RX 250 W HCPCS SELF ADMINISTERED DRUGS (ALT 637 FOR MEDICARE OP, ALT 636 FOR OP/ED): Performed by: HOSPITALIST

## 2023-11-20 PROCEDURE — 94640 AIRWAY INHALATION TREATMENT: CPT

## 2023-11-20 PROCEDURE — 99232 SBSQ HOSP IP/OBS MODERATE 35: CPT | Performed by: INTERNAL MEDICINE

## 2023-11-20 RX ORDER — ALBUTEROL SULFATE 0.83 MG/ML
2.5 SOLUTION RESPIRATORY (INHALATION)
Status: DISCONTINUED | OUTPATIENT
Start: 2023-11-20 | End: 2023-11-20 | Stop reason: HOSPADM

## 2023-11-20 RX ORDER — FUROSEMIDE 40 MG/1
40 TABLET ORAL DAILY
Status: DISCONTINUED | OUTPATIENT
Start: 2023-11-20 | End: 2023-11-20 | Stop reason: HOSPADM

## 2023-11-20 RX ORDER — FUROSEMIDE 40 MG/1
40 TABLET ORAL DAILY
Qty: 30 TABLET | Refills: 3 | Status: SHIPPED | OUTPATIENT
Start: 2023-11-21 | End: 2024-03-20

## 2023-11-20 RX ORDER — CARVEDILOL 3.12 MG/1
3.12 TABLET ORAL
Qty: 60 TABLET | Refills: 3 | Status: SHIPPED | OUTPATIENT
Start: 2023-11-20 | End: 2024-03-19

## 2023-11-20 RX ORDER — VALSARTAN 40 MG/1
40 TABLET ORAL DAILY
Qty: 30 TABLET | Refills: 3 | Status: SHIPPED | OUTPATIENT
Start: 2023-11-21 | End: 2024-03-20

## 2023-11-20 RX ADMIN — FUROSEMIDE 40 MG: 10 INJECTION, SOLUTION INTRAMUSCULAR; INTRAVENOUS at 08:29

## 2023-11-20 RX ADMIN — CARVEDILOL 3.12 MG: 3.12 TABLET, FILM COATED ORAL at 08:29

## 2023-11-20 RX ADMIN — POLYETHYLENE GLYCOL 3350 17 G: 17 POWDER, FOR SOLUTION ORAL at 08:29

## 2023-11-20 RX ADMIN — ALBUTEROL SULFATE 2.5 MG: 2.5 SOLUTION RESPIRATORY (INHALATION) at 07:45

## 2023-11-20 RX ADMIN — VALSARTAN 40 MG: 80 TABLET, FILM COATED ORAL at 08:29

## 2023-11-20 ASSESSMENT — COGNITIVE AND FUNCTIONAL STATUS - GENERAL
DAILY ACTIVITIY SCORE: 24
MOBILITY SCORE: 24

## 2023-11-20 ASSESSMENT — PAIN - FUNCTIONAL ASSESSMENT: PAIN_FUNCTIONAL_ASSESSMENT: 0-10

## 2023-11-20 ASSESSMENT — PAIN SCALES - GENERAL: PAINLEVEL_OUTOF10: 0 - NO PAIN

## 2023-11-20 NOTE — PROGRESS NOTES
Kishore thanks  [facility] Progress Note           Rounding Cardiologist:  Dr. Christopher Ricardo  Primary Cardiologist: Dr. Jose A Gallagher    Date:  11/20/2023  Patient:  Zechariah Martinez  YOB: 1945  MRN:  [id number]   Admit Date:  11/17/2023      SUBJECTIVE:    Zechariah Martinez was seen and examined today at bedside.     He denies any chest pain.  Mild shortness of breath.     Patient states he feels better and is hoping to go home.        VITALS:     Vitals:    11/19/23 1340 11/19/23 1349 11/19/23 2012 11/20/23 0646   BP: 122/57  144/71 131/65   BP Location:   Left arm    Patient Position:   Lying    Pulse: 70  79 74   Resp:   16 16   Temp: 36.7 °C (98.1 °F)  35.3 °C (95.5 °F) 36.5 °C (97.7 °F)   TempSrc:   Temporal    SpO2: 98% 97% 99% 99%   Weight:       Height:           Intake/Output Summary (Last 24 hours) at 11/20/2023 1010  Last data filed at 11/20/2023 0200  Gross per 24 hour   Intake 480 ml   Output 1200 ml   Net -720 ml       Wt Readings from Last 4 Encounters:   11/18/23 88 kg (194 lb 0.1 oz)       CURRENT HOSPITAL MEDICATIONS:   albuterol, 2.5 mg, nebulization, TID  carvedilol, 3.125 mg, oral, BID  enoxaparin, 40 mg, subcutaneous, q24h  furosemide, 40 mg, intravenous, BID  perflutren protein A microsphere, 0.5 mL, intravenous, Once in imaging  polyethylene glycol, 17 g, oral, Daily  sulfur hexafluoride microsphr, 2 mL, intravenous, Once in imaging  valsartan, 40 mg, oral, Daily         Current Outpatient Medications   Medication Instructions    aspirin 81 mg, oral, Daily    carvedilol (COREG) 3.125 mg, oral, 2 times daily with meals    furosemide (LASIX) 20 mg, oral, Daily    lisinopril 2.5 mg, oral, Daily    omeprazole (PRILOSEC) 40 mg, oral, Daily, Do not crush or chew.    spironolactone (ALDACTONE) 12.5 mg, oral, Daily        PHYSICAL EXAMINATION:   GENERAL:  Well developed, well nourished, in no acute distress.  CHEST:  Symmetric and nontender.  NEURO/PSYCH:  Alert and oriented times three with  approppriate behavior and responses.  NECK:  Supple, no JVD, no bruit.  LUNGS:  Clear to auscultation bilaterally, normal respiratory effort.  HEART:  Rate and rhythm regular with I/VI AMBER LSB no gallop appreciated.        There are no rubs, clicks or heaves.  EXTREMITIES:  Warm with good color, no clubbing or cyanosis.  There is no edema noted.  PERIPHERAL VASCULAR:  Pulses present and equally palpable; 2+ throughout.      LAB DATA:     CBC:   Results from last 7 days   Lab Units 11/19/23  1404   WBC AUTO x10*3/uL 7.6   RBC AUTO x10*6/uL 3.86*   HEMOGLOBIN g/dL 12.1*   HEMATOCRIT % 36.9*   PLATELETS AUTO x10*3/uL 207        CMP:    Results from last 7 days   Lab Units 11/19/23  1404   SODIUM mmol/L 136   POTASSIUM mmol/L 3.9   CHLORIDE mmol/L 103   CO2 mmol/L 25   BUN mg/dL 30*   CREATININE mg/dL 1.59*   GLUCOSE mg/dL 104*   CALCIUM mg/dL 8.8       Cardiac Enzymes:    Lab Results   Component Value Date    TROPHS 45 (H) 11/17/2023    TROPHS 47 (H) 11/17/2023       Magnesium:    Lab Results   Component Value Date    MG 2.04 11/19/2023       BNP:   Lab Results   Component Value Date    BNP 2,647 (H) 11/17/2023        CBC:  Lab Results   Component Value Date    WBC 7.6 11/19/2023    WBC 7.7 11/18/2023    WBC 6.8 11/17/2023    RBC 3.86 (L) 11/19/2023    RBC 3.70 (L) 11/18/2023    RBC 3.98 (L) 11/17/2023    HGB 12.1 (L) 11/19/2023    HGB 11.7 (L) 11/18/2023    HGB 12.7 (L) 11/17/2023    HCT 36.9 (L) 11/19/2023    HCT 36.0 (L) 11/18/2023    HCT 38.1 (L) 11/17/2023    MCV 96 11/19/2023    MCV 97 11/18/2023    MCV 96 11/17/2023    MCH 31.3 11/19/2023    MCH 31.6 11/18/2023    MCH 31.9 11/17/2023    MCHC 32.8 11/19/2023    MCHC 32.5 11/18/2023    MCHC 33.3 11/17/2023    RDW 13.7 11/19/2023    RDW 13.7 11/18/2023    RDW 13.5 11/17/2023     11/19/2023     11/18/2023     11/17/2023       BMP:  Lab Results   Component Value Date     11/19/2023     11/18/2023     11/17/2023    K 3.9  11/19/2023    K 3.9 11/18/2023    K 4.4 11/17/2023     11/19/2023     (H) 11/18/2023     11/17/2023    CO2 25 11/19/2023    CO2 22 11/18/2023    CO2 22 11/17/2023    BUN 30 (H) 11/19/2023    BUN 29 (H) 11/18/2023    BUN 29 (H) 11/17/2023    CREATININE 1.59 (H) 11/19/2023    CREATININE 1.64 (H) 11/18/2023    CREATININE 1.51 (H) 11/17/2023       Hepatic Function Panel:    Lab Results   Component Value Date    ALKPHOS 134 11/17/2023    ALT 26 11/17/2023    AST 28 11/17/2023    PROT 7.9 11/17/2023    BILITOT 0.8 11/17/2023       DIAGNOSTIC TESTING:     Transthoracic Echo (TTE) Complete  Result Date: 11/19/2023    CONCLUSIONS:  1. Left ventricular systolic function is normal with a 25-30% estimated ejection fraction.  2. There is no evidence of mitral valve stenosis.  3. Mild mitral valve regurgitation.  4. Mild tricuspid regurgitation is visualized.  5. Aortic valve stenosis is not present.  6. Moderate aortic valve regurgitation.  7. The main pulmonary artery is normal in size, and position, with normal bifurcation into the left and right pulmonary arteries.     ECG 12 lead    Result Date: 11/18/2023  Sinus rhythm with occasional Premature ventricular complexes Left axis deviation Moderate voltage criteria for LVH, may be normal variant Nonspecific ST and T wave abnormality Prolonged QT Abnormal ECG When compared with ECG of 17-NOV-2023 14:24, No significant change was found See ED provider note for full interpretation and clinical correlation Confirmed by Jose A Gallagher ([id number]) on 11/18/2023 12:42:53 PM    ECG 12 lead    Result Date: 11/18/2023  Sinus rhythm with 1st degree AV block with occasional Premature ventricular complexes Minimal voltage criteria for LVH, may be normal variant Nonspecific ST and T wave abnormality Prolonged QT Abnormal ECG When compared with ECG of 06-MAY-2016 23:25, Premature ventricular complexes are now Present QRS duration has increased Nonspecific T wave abnormality, worse  in Lateral leads QT has lengthened See ED provider note for full interpretation and clinical correlation Confirmed by Jose A Gallagher ([id number]) on 11/18/2023 12:42:47 PM    XR chest 2 views    Result Date: 11/17/2023  Interpreted By:  Alex Fu, STUDY: XR CHEST 2 VIEWS;  11/17/2023 3:03 pm      1. No evidence of acute cardiopulmonary process. 2. Mild cardiomegaly.       MACRO: None   Signed by: Alex Fu 11/17/2023 3:35 PM Dictation workstation:   [id number]       RADIOLOGY:     Transthoracic Echo (TTE) Complete   Final Result      XR chest 2 views   Final Result   1. No evidence of acute cardiopulmonary process.   2. Mild cardiomegaly.                  MACRO:   None        Signed by: Alex Fu 11/17/2023 3:35 PM   Dictation workstation:   [id number]          PROBLEM LIST     Patient Active Problem List   Diagnosis    Acute on chronic congestive heart failure, unspecified heart failure type (CMS/HCC)       ASSESSMENT:     Acute on chronic systolic congestive heart failure.  Improved.  2.   Non-ischemic cardiomyopathy.  LVEF 30%.  Mild CAD by cath earlier this year.    3.   Mild to moderate aortic regurgitation.   4.   Chronic kidney disease.      PLAN:     Increase activity.      Change to po furosemide 40 mg daily.    OK to discharge from a cardiac standpoint.    Patient has opted against a LifeVest/ICD to this point.    He will follow-up with Dr. Gallagher.    Follow-up with general cardiology at [facility].     Please do not hesitate to call with questions.  Electronically signed by Christopher Ricardo MD, on 11/20/2023 at 10:10 AM

## 2023-11-20 NOTE — TREATMENT PLAN
Pulse Oximetry on room air at rest: 97    2.   Pulse Oximetry on room air during ambulation: 96    3.   Pulse Oximetry on oxygen during ambulation: N/A    4.   Amount of oxygen during ambulation: N/A    5.   Pulse oximetry during recovery: 95    6.   Amount of oxygen during recovery: N/A    Does this patient qualify for home O2? No    What is the patient's Pulmonary Diagnosis: CHF    What is the patient's DME company:    Comments:

## 2023-11-20 NOTE — PROGRESS NOTES
11/20/23 1234   Discharge Planning   Living Arrangements Spouse/significant other   Support Systems Spouse/significant other   Assistance Needed none PTA   Type of Residence Private residence   Home or Post Acute Services None   Patient expects to be discharged to: HOME   Does the patient need discharge transport arranged? No     HOME no needs. Does not qualify for home oxygen.

## 2023-11-20 NOTE — DISCHARGE SUMMARY
Discharge Diagnosis  Acute on chronic congestive heart failure, unspecified heart failure type (CMS/[facility])    Issues Requiring Follow-Up  Chronic congestive heart failure with reduced EF    Discharge Meds     Your medication list        START taking these medications        Instructions Last Dose Given Next Dose Due   valsartan 40 mg tablet  Commonly known as: Diovan  Start taking on: November 21, 2023      Take 1 tablet (40 mg) by mouth once daily. Do not start before November 21, 2023.              CHANGE how you take these medications        Instructions Last Dose Given Next Dose Due   furosemide 40 mg tablet  Commonly known as: Lasix  Start taking on: November 21, 2023  What changed:   medication strength  how much to take      Take 1 tablet (40 mg) by mouth once daily. Do not start before November 21, 2023.              CONTINUE taking these medications        Instructions Last Dose Given Next Dose Due   aspirin 81 mg EC tablet           carvedilol 3.125 mg tablet  Commonly known as: Coreg      Take 1 tablet (3.125 mg) by mouth 2 times a day with meals.              STOP taking these medications      lisinopril 2.5 mg tablet        omeprazole 40 mg DR capsule  Commonly known as: PriLOSEC        spironolactone 25 mg tablet  Commonly known as: Aldactone                  Where to Get Your Medications        These medications were sent to [id number] - [place], [place] - [address] AT [facility] [address]  [address], [facility] [address]      Phone: [phone]   carvedilol 3.125 mg tablet  furosemide 40 mg tablet  valsartan 40 mg tablet         Test Results Pending At Discharge  Pending Labs       No current pending labs.            Hospital Course  Zechariah Martinez is a 79 yo male who presented for acute on chronic CHF exacerbation with acute respiratory failure. Patient was admitted for further evaluation. Echo showed LVEF of 25-30%, CXR without significant pulmonary edema but  shows mild interstitial infiltrates and cardiomegaly. BNP 2,647, troponin 47 & 45 on admission. Cardiology consulted and helped optimize medications. Patient continues to opt against ICD or life vest. Was seen by respiratory therapy and does not qualify for home O2. RN educated about heart failure, tobacco cessation, and importance of taking medications as prescribed. Patient is being discharged in stable condition and with improvement in exacerbation symptoms.    Acute respiratry failure with hypoxia likely from CHF exacerbation, resolved  Acute on chronic CHF with reduced EF resolved  Hx nonischemic cardiomyopathy   - echo 11/18/23: LVEF 25-30%, mild mitral and tricuspid regurgitation, moderate aortic regurgitation  - has not been on meds at home due to non-compliance  - start PO furosemide 40 mg daily, continue aspirin, coreg and valsartan  - home o2 eval done and does not qualify   - patient opted against life vest and ICD during hospital stay  - Non-compliant with meds at home, daughter interested in discussion with palliative care outpatient   - follow up with Dr. Gallagher and general cardiology at [facility]     CKD stage 3a baseline around 1.3, refused labs so unable to compare, on 11/19 was 1.59     Hx tobacco use, recently quit in March possible COPD, stable  - Stressed importance of avoiding tobacco    Greater than 30 minutes of clinical time spent caring for his patient.     Pertinent Physical Exam At Time of Discharge  Physical Exam  Constitutional:       General: He is not in acute distress.     Appearance: He is not ill-appearing or diaphoretic.      Interventions: Nasal cannula in place.   HENT:      Head: Normocephalic and atraumatic.      Mouth/Throat:      Mouth: Mucous membranes are moist.   Eyes:      Extraocular Movements: Extraocular movements intact.      Conjunctiva/sclera: Conjunctivae normal.      Pupils: Pupils are equal, round, and reactive to light.   Cardiovascular:      Rate and Rhythm: Normal  rate and regular rhythm.      Pulses: Normal pulses.           Radial pulses are 2+ on the right side and 2+ on the left side.      Heart sounds: Normal heart sounds.   Pulmonary:      Effort: Pulmonary effort is normal. No accessory muscle usage.      Breath sounds: Normal breath sounds. No stridor. No wheezing or rhonchi.   Abdominal:      General: Abdomen is flat. Bowel sounds are normal.      Palpations: Abdomen is soft. There is no mass.      Tenderness: There is no abdominal tenderness.   Musculoskeletal:      Cervical back: Normal range of motion and neck supple.      Right lower leg: No edema.      Left lower leg: No edema.   Skin:     General: Skin is warm and dry.   Neurological:      Mental Status: He is alert.      Motor: Motor function is intact.   Psychiatric:         Mood and Affect: Mood normal.         Behavior: Behavior normal.         Thought Content: Thought content normal.         Judgment: Judgment normal.       Outpatient Follow-Up  Future Appointments   Date Time Provider Department Center   1/10/2024 11:30 AM Jose A Gallagher MD [id number] [place]

## 2023-11-20 NOTE — CARE PLAN
The patient's goals for the shift include No SOB through end of shift    The clinical goals for the shift include patient will remain hemodynamically stable      Problem: Heart Failure  Goal: Improved gas exchange this shift  Outcome: Progressing  Goal: Improved urinary output this shift  Outcome: Progressing  Goal: Reduction in peripheral edema within 24 hours  Outcome: Progressing  Goal: Report improvement of dyspnea/breathlessness this shift  Outcome: Progressing  Goal: Weight from fluid excess reduced over 2-3 days, then stabilize  Outcome: Progressing  Goal: Increase self care and/or family involvement in 24 hours  Outcome: Progressing     Problem: Pain  Goal: My pain/discomfort is manageable  Outcome: Progressing     Problem: Safety  Goal: Patient will be injury free during hospitalization  Outcome: Progressing  Goal: I will remain free of falls  Outcome: Progressing     Problem: Daily Care  Goal: Daily care needs are met  Outcome: Progressing     Problem: Psychosocial Needs  Goal: Demonstrates ability to cope with hospitalization/illness  Outcome: Progressing  Goal: Collaborate with me, my family, and caregiver to identify my specific goals  Outcome: Progressing     Problem: Discharge Barriers  Goal: My discharge needs are met  Outcome: Progressing

## 2023-11-20 NOTE — CARE PLAN
The patient's goals for the shift include No SOB through end of shift    The clinical goals for the shift include patient will remain hemodynamically stable      Problem: Heart Failure  Goal: Improved gas exchange this shift  11/20/2023 1155 by Reta Duke RN  Outcome: Progressing  11/20/2023 1155 by Reta Duke RN  Outcome: Progressing  Goal: Improved urinary output this shift  11/20/2023 1155 by Reta Duke RN  Outcome: Progressing  11/20/2023 1155 by Reta Duke RN  Outcome: Progressing  Goal: Reduction in peripheral edema within 24 hours  11/20/2023 1155 by Reta Duke RN  Outcome: Progressing  11/20/2023 1155 by Reta Duke RN  Outcome: Progressing  Goal: Report improvement of dyspnea/breathlessness this shift  11/20/2023 1155 by Reta Duke RN  Outcome: Progressing  11/20/2023 1155 by Reta Duke RN  Outcome: Progressing  Goal: Weight from fluid excess reduced over 2-3 days, then stabilize  11/20/2023 1155 by Reta Duke RN  Outcome: Progressing  11/20/2023 1155 by Reta Duke RN  Outcome: Progressing  Goal: Increase self care and/or family involvement in 24 hours  11/20/2023 1155 by Reta Duke RN  Outcome: Progressing  11/20/2023 1155 by Reta Duke RN  Outcome: Progressing     Problem: Pain  Goal: My pain/discomfort is manageable  Outcome: Met     Problem: Safety  Goal: Patient will be injury free during hospitalization  Outcome: Met  Goal: I will remain free of falls  Outcome: Met     Problem: Daily Care  Goal: Daily care needs are met  11/20/2023 1155 by Reta Duke RN  Outcome: Met  11/20/2023 1155 by Reta Duke RN  Outcome: Progressing     Problem: Psychosocial Needs  Goal: Demonstrates ability to cope with hospitalization/illness  Outcome: Met  Goal: Collaborate with me, my family, and caregiver to identify my specific goals  Outcome: Met     Problem: Discharge Barriers  Goal: My discharge needs are met  11/20/2023 1155 by Reta Duke RN  Outcome:  Progressing  11/20/2023 1155 by Reta Duke RN  Outcome: Progressing

## 2023-11-21 ENCOUNTER — PATIENT OUTREACH (OUTPATIENT)
Dept: PRIMARY CARE | Facility: CLINIC | Age: 78
End: 2023-11-21
Payer: MEDICARE

## 2023-11-21 NOTE — PROGRESS NOTES
Pt would like to decline TCM program at this time and see General Cardio - Dr Middleton prior to seeing Dr Gallagher in January. Patient declined next outreach. I will decline TCM program at this time.     Engagement  Call Start Time: 1506 (11/21/2023  3:06 PM)    Medications  Medications reviewed with patient/caregiver?: No (11/21/2023  3:06 PM)  Is the patient having any side effects they believe may be caused by any medication additions or changes?: No (11/21/2023  3:06 PM)  Does the patient have all medications ordered at discharge?: No (on way to  now- had to wait for them to come in) (11/21/2023  3:06 PM)  What is keeping the patient from filling the prescriptions?: -- (Told not ready yesterday -[id number] -  [facility]) (11/21/2023  3:06 PM)    Appointments  Has the patient kept scheduled appointments due by today?: Yes (11/21/2023  3:06 PM)  Care Management Interventions: Advised to schedule with specialist (Patient said Daughter set up appt with Dr Middleton [facility] but he was not sure of time or date yet) (11/21/2023  3:06 PM)    Self Management  Has home health visited the patient within 72 hours of discharge?: Not applicable (11/21/2023  3:06 PM)  What Durable Medical Equipment (DME) was ordered?: Patient has opted against a LifeVest/ICD to this point. (11/21/2023  3:06 PM)  Has all Durable Medical Equipment (DME) been delivered?: -- (n/a) (11/21/2023  3:06 PM)    Patient Teaching  Does the patient have access to their discharge instructions?: Yes (11/21/2023  3:06 PM)  Care Management Interventions: Reviewed instructions with patient (11/21/2023  3:06 PM)  What is the patient's perception of their health status since discharge?: Same (11/21/2023  3:06 PM)  Is the patient/caregiver able to teach back the hierarchy of who to call/visit for symptoms/problems? PCP, Specialist, Home Health nurse, Urgent Care, ED, 911: Yes (11/21/2023  3:06 PM)  Patient/Caregiver Education  Comments: Patient would like to decline the TCM program. Would like to follow up with general cardiologist Dr Middleton at [facility] before seeing Dr Gallagher. Pt is aware of appt set for Dr Gallagher in January. (11/21/2023  3:06 PM)    Wrap Up  Is the patient/caregiver familiar with Advance Care Planning?: Yes (11/21/2023  3:06 PM)  Would the patient like more information on Advance Care Planning?: No (11/21/2023  3:06 PM)  Call End Time: 1510 (11/21/2023  3:06 PM)

## 2023-11-21 NOTE — NURSING NOTE
Follow up phone call made by CHF Clinical Nurse Navigator. Spoke with patient who is doing well. He said he has not yet picked up his prescriptions because they will not be ready until 3pm today and he will pick them up then. He is aware of his follow up appointment with Dr. Gallagher on 1/10/24. No questions or concerns at this time. Reminded patient of my contact information should any questions or concerns arise.

## 2023-12-20 PROBLEM — I50.23 ACUTE ON CHRONIC SYSTOLIC HEART FAILURE (MULTI): Status: ACTIVE | Noted: 2023-05-02

## 2023-12-20 PROBLEM — I10 HYPERTENSION, ESSENTIAL: Status: ACTIVE | Noted: 2017-09-18

## 2023-12-20 PROBLEM — R06.02 SOB (SHORTNESS OF BREATH): Status: ACTIVE | Noted: 2023-04-07

## 2023-12-20 PROBLEM — R74.8 CARDIAC ENZYMES ELEVATED: Status: ACTIVE | Noted: 2023-04-07

## 2023-12-20 RX ORDER — LISINOPRIL 2.5 MG/1
2.5 TABLET ORAL
COMMUNITY
Start: 2023-07-18

## 2023-12-20 RX ORDER — SPIRONOLACTONE 25 MG/1
12.5 TABLET ORAL
COMMUNITY
Start: 2023-07-18

## 2023-12-20 RX ORDER — OMEPRAZOLE 40 MG/1
40 CAPSULE, DELAYED RELEASE ORAL
COMMUNITY
Start: 2023-06-16

## 2024-01-10 ENCOUNTER — APPOINTMENT (OUTPATIENT)
Dept: CARDIOLOGY | Facility: CLINIC | Age: 79
End: 2024-01-10
Payer: MEDICARE

## 2024-03-24 DIAGNOSIS — R79.89 ELEVATED TROPONIN LEVEL: ICD-10-CM

## 2024-03-24 DIAGNOSIS — I50.9 ACUTE ON CHRONIC CONGESTIVE HEART FAILURE, UNSPECIFIED HEART FAILURE TYPE (MULTI): ICD-10-CM

## 2024-03-24 DIAGNOSIS — J96.01 ACUTE RESPIRATORY FAILURE WITH HYPOXIA (MULTI): ICD-10-CM

## 2024-03-25 RX ORDER — VALSARTAN 40 MG/1
TABLET ORAL
Qty: 30 TABLET | Refills: 3 | OUTPATIENT
Start: 2024-03-25

## 2024-03-25 RX ORDER — FUROSEMIDE 40 MG/1
TABLET ORAL
Qty: 30 TABLET | Refills: 3 | OUTPATIENT
Start: 2024-03-25